# Patient Record
Sex: FEMALE | Race: WHITE | NOT HISPANIC OR LATINO | URBAN - METROPOLITAN AREA
[De-identification: names, ages, dates, MRNs, and addresses within clinical notes are randomized per-mention and may not be internally consistent; named-entity substitution may affect disease eponyms.]

---

## 2017-01-02 ENCOUNTER — TRANSFERRED RECORDS (OUTPATIENT)
Dept: HEALTH INFORMATION MANAGEMENT | Facility: CLINIC | Age: 82
End: 2017-01-02

## 2017-01-03 ENCOUNTER — HOSPITAL ENCOUNTER (INPATIENT)
Facility: CLINIC | Age: 82
LOS: 8 days | Discharge: HOME | DRG: 885 | End: 2017-01-11
Attending: PSYCHIATRY & NEUROLOGY | Admitting: PSYCHIATRY & NEUROLOGY
Payer: MEDICARE

## 2017-01-03 DIAGNOSIS — F41.8 DEPRESSION WITH ANXIETY: ICD-10-CM

## 2017-01-03 DIAGNOSIS — F32.9 MAJOR DEPRESSION: Primary | ICD-10-CM

## 2017-01-03 PROBLEM — R45.851 DEPRESSION WITH SUICIDAL IDEATION: Status: ACTIVE | Noted: 2017-01-03

## 2017-01-03 PROBLEM — F32.A DEPRESSION WITH SUICIDAL IDEATION: Status: ACTIVE | Noted: 2017-01-03

## 2017-01-03 PROCEDURE — 25000132 ZZH RX MED GY IP 250 OP 250 PS 637: Mod: GY | Performed by: PHYSICIAN ASSISTANT

## 2017-01-03 PROCEDURE — 25000132 ZZH RX MED GY IP 250 OP 250 PS 637: Mod: GY | Performed by: PSYCHIATRY & NEUROLOGY

## 2017-01-03 PROCEDURE — 90853 GROUP PSYCHOTHERAPY: CPT

## 2017-01-03 PROCEDURE — A9270 NON-COVERED ITEM OR SERVICE: HCPCS | Mod: GY | Performed by: PSYCHIATRY & NEUROLOGY

## 2017-01-03 PROCEDURE — 99232 SBSQ HOSP IP/OBS MODERATE 35: CPT | Performed by: PHYSICIAN ASSISTANT

## 2017-01-03 PROCEDURE — A9270 NON-COVERED ITEM OR SERVICE: HCPCS | Mod: GY | Performed by: PHYSICIAN ASSISTANT

## 2017-01-03 PROCEDURE — 87086 URINE CULTURE/COLONY COUNT: CPT | Performed by: PHYSICIAN ASSISTANT

## 2017-01-03 PROCEDURE — 12400002 ZZH R&B MH SENIOR/ADOLESCENT

## 2017-01-03 PROCEDURE — 99222 1ST HOSP IP/OBS MODERATE 55: CPT | Mod: AI | Performed by: PSYCHIATRY & NEUROLOGY

## 2017-01-03 RX ORDER — LOSARTAN POTASSIUM 25 MG/1
25 TABLET ORAL DAILY
Status: DISCONTINUED | OUTPATIENT
Start: 2017-01-03 | End: 2017-01-11 | Stop reason: HOSPADM

## 2017-01-03 RX ORDER — RISPERIDONE 1 MG/1
1 TABLET ORAL DAILY PRN
Status: DISCONTINUED | OUTPATIENT
Start: 2017-01-03 | End: 2017-01-03

## 2017-01-03 RX ORDER — LORAZEPAM 0.5 MG/1
0.5 TABLET ORAL EVERY 6 HOURS PRN
Status: DISCONTINUED | OUTPATIENT
Start: 2017-01-03 | End: 2017-01-11 | Stop reason: HOSPADM

## 2017-01-03 RX ORDER — CLOPIDOGREL BISULFATE 75 MG/1
75 TABLET ORAL DAILY
Status: DISCONTINUED | OUTPATIENT
Start: 2017-01-03 | End: 2017-01-11 | Stop reason: HOSPADM

## 2017-01-03 RX ORDER — ARIPIPRAZOLE 2 MG/1
4 TABLET ORAL DAILY
Status: DISCONTINUED | OUTPATIENT
Start: 2017-01-03 | End: 2017-01-03

## 2017-01-03 RX ORDER — BISACODYL 10 MG
10 SUPPOSITORY, RECTAL RECTAL DAILY PRN
Status: DISCONTINUED | OUTPATIENT
Start: 2017-01-03 | End: 2017-01-11 | Stop reason: HOSPADM

## 2017-01-03 RX ORDER — CARVEDILOL 6.25 MG/1
12.5 TABLET ORAL 2 TIMES DAILY WITH MEALS
Status: DISCONTINUED | OUTPATIENT
Start: 2017-01-03 | End: 2017-01-04

## 2017-01-03 RX ORDER — DULOXETIN HYDROCHLORIDE 60 MG/1
60 CAPSULE, DELAYED RELEASE ORAL DAILY
Status: DISCONTINUED | OUTPATIENT
Start: 2017-01-03 | End: 2017-01-03

## 2017-01-03 RX ORDER — ACETAMINOPHEN 325 MG/1
650 TABLET ORAL EVERY 4 HOURS PRN
Status: DISCONTINUED | OUTPATIENT
Start: 2017-01-03 | End: 2017-01-11 | Stop reason: HOSPADM

## 2017-01-03 RX ORDER — HYDROCHLOROTHIAZIDE 25 MG/1
25 TABLET ORAL DAILY
Status: DISCONTINUED | OUTPATIENT
Start: 2017-01-03 | End: 2017-01-11 | Stop reason: HOSPADM

## 2017-01-03 RX ORDER — ALUMINA, MAGNESIA, AND SIMETHICONE 2400; 2400; 240 MG/30ML; MG/30ML; MG/30ML
30 SUSPENSION ORAL EVERY 4 HOURS PRN
Status: DISCONTINUED | OUTPATIENT
Start: 2017-01-03 | End: 2017-01-11 | Stop reason: HOSPADM

## 2017-01-03 RX ORDER — OLANZAPINE 5 MG/1
5 TABLET ORAL
Status: DISCONTINUED | OUTPATIENT
Start: 2017-01-03 | End: 2017-01-11 | Stop reason: HOSPADM

## 2017-01-03 RX ORDER — ARIPIPRAZOLE 5 MG/1
5 TABLET ORAL DAILY
Status: DISCONTINUED | OUTPATIENT
Start: 2017-01-04 | End: 2017-01-11 | Stop reason: HOSPADM

## 2017-01-03 RX ORDER — OLANZAPINE 10 MG/2ML
5 INJECTION, POWDER, FOR SOLUTION INTRAMUSCULAR
Status: DISCONTINUED | OUTPATIENT
Start: 2017-01-03 | End: 2017-01-11 | Stop reason: HOSPADM

## 2017-01-03 RX ORDER — TRAZODONE HYDROCHLORIDE 50 MG/1
50 TABLET, FILM COATED ORAL
Status: DISCONTINUED | OUTPATIENT
Start: 2017-01-03 | End: 2017-01-11 | Stop reason: HOSPADM

## 2017-01-03 RX ORDER — ASPIRIN 81 MG/1
81 TABLET, CHEWABLE ORAL DAILY
Status: DISCONTINUED | OUTPATIENT
Start: 2017-01-03 | End: 2017-01-11 | Stop reason: HOSPADM

## 2017-01-03 RX ORDER — POLYETHYLENE GLYCOL 3350 17 G/17G
17 POWDER, FOR SOLUTION ORAL DAILY
Status: DISCONTINUED | OUTPATIENT
Start: 2017-01-03 | End: 2017-01-11 | Stop reason: HOSPADM

## 2017-01-03 RX ORDER — HYDROXYZINE HYDROCHLORIDE 25 MG/1
25-50 TABLET, FILM COATED ORAL EVERY 4 HOURS PRN
Status: DISCONTINUED | OUTPATIENT
Start: 2017-01-03 | End: 2017-01-11 | Stop reason: HOSPADM

## 2017-01-03 RX ORDER — NITROGLYCERIN 0.4 MG/1
0.4 TABLET SUBLINGUAL EVERY 5 MIN PRN
Status: DISCONTINUED | OUTPATIENT
Start: 2017-01-03 | End: 2017-01-11 | Stop reason: HOSPADM

## 2017-01-03 RX ORDER — ATORVASTATIN CALCIUM 40 MG/1
40 TABLET, FILM COATED ORAL AT BEDTIME
Status: DISCONTINUED | OUTPATIENT
Start: 2017-01-03 | End: 2017-01-11 | Stop reason: HOSPADM

## 2017-01-03 RX ORDER — ASCORBIC ACID 500 MG
500 TABLET ORAL DAILY
Status: DISCONTINUED | OUTPATIENT
Start: 2017-01-03 | End: 2017-01-11 | Stop reason: HOSPADM

## 2017-01-03 RX ORDER — NYSTATIN 100000 U/G
CREAM TOPICAL 3 TIMES DAILY PRN
Status: DISCONTINUED | OUTPATIENT
Start: 2017-01-03 | End: 2017-01-11 | Stop reason: HOSPADM

## 2017-01-03 RX ORDER — NYSTATIN 100000 U/G
CREAM TOPICAL 3 TIMES DAILY PRN
COMMUNITY

## 2017-01-03 RX ORDER — MIRTAZAPINE 15 MG/1
15 TABLET, FILM COATED ORAL AT BEDTIME
Status: DISCONTINUED | OUTPATIENT
Start: 2017-01-03 | End: 2017-01-11 | Stop reason: HOSPADM

## 2017-01-03 RX ORDER — LORAZEPAM 1 MG/1
1 TABLET ORAL EVERY 6 HOURS PRN
Status: DISCONTINUED | OUTPATIENT
Start: 2017-01-03 | End: 2017-01-03

## 2017-01-03 RX ORDER — PANTOPRAZOLE SODIUM 40 MG/1
40 TABLET, DELAYED RELEASE ORAL
Status: DISCONTINUED | OUTPATIENT
Start: 2017-01-03 | End: 2017-01-11 | Stop reason: HOSPADM

## 2017-01-03 RX ORDER — POLYETHYLENE GLYCOL 3350 17 G/17G
17 POWDER, FOR SOLUTION ORAL DAILY
COMMUNITY

## 2017-01-03 RX ORDER — ARIPIPRAZOLE 2 MG/1
4 TABLET ORAL DAILY
Status: ON HOLD | COMMUNITY
End: 2017-01-10

## 2017-01-03 RX ADMIN — CHOLECALCIFEROL CAP 125 MCG (5000 UNIT) 5000 UNITS: 125 CAP at 09:22

## 2017-01-03 RX ADMIN — POLYETHYLENE GLYCOL 3350 17 G: 17 POWDER, FOR SOLUTION ORAL at 09:18

## 2017-01-03 RX ADMIN — PANTOPRAZOLE SODIUM 40 MG: 40 TABLET, DELAYED RELEASE ORAL at 06:58

## 2017-01-03 RX ADMIN — HYDROCHLOROTHIAZIDE 25 MG: 25 TABLET ORAL at 09:27

## 2017-01-03 RX ADMIN — ATORVASTATIN CALCIUM 40 MG: 40 TABLET, FILM COATED ORAL at 21:18

## 2017-01-03 RX ADMIN — CLOPIDOGREL BISULFATE 75 MG: 75 TABLET, FILM COATED ORAL at 09:22

## 2017-01-03 RX ADMIN — LOSARTAN POTASSIUM 25 MG: 25 TABLET, FILM COATED ORAL at 09:26

## 2017-01-03 RX ADMIN — CARVEDILOL 12.5 MG: 6.25 TABLET, FILM COATED ORAL at 09:27

## 2017-01-03 RX ADMIN — MIRTAZAPINE 15 MG: 15 TABLET, FILM COATED ORAL at 21:18

## 2017-01-03 RX ADMIN — CARVEDILOL 12.5 MG: 6.25 TABLET, FILM COATED ORAL at 18:37

## 2017-01-03 RX ADMIN — ARIPIPRAZOLE 4 MG: 2 TABLET ORAL at 12:19

## 2017-01-03 RX ADMIN — ASPIRIN 81 MG CHEWABLE TABLET 81 MG: 81 TABLET CHEWABLE at 09:25

## 2017-01-03 ASSESSMENT — ACTIVITIES OF DAILY LIVING (ADL)
GROOMING: INDEPENDENT
GROOMING: INDEPENDENT
ORAL_HYGIENE: INDEPENDENT
ORAL_HYGIENE: INDEPENDENT
DRESS: INDEPENDENT;STREET CLOTHES
ORAL_HYGIENE: INDEPENDENT
GROOMING: INDEPENDENT
DRESS: INDEPENDENT
DRESS: INDEPENDENT

## 2017-01-03 NOTE — IP AVS SNAPSHOT
MRN:[id number]                      After Visit Summary   1/3/2017    Luna Hassan    MRN: [id number]           Thank you!     Thank you for choosing [facility] for your care. Our goal is always to provide you with excellent care.        Patient Information     Date Of Birth          12/12/1932        About your hospital stay     You were admitted on:  January 3, 2017 You last received care in the:  [address]    You were discharged on:  January 11, 2017       Who to Call     For medical emergencies, please call 911.  For non-urgent questions about your medical care, please call your primary care provider or clinic, [phone]          Attending Provider     Provider    Zoey Alvarez MD       Primary Care Provider Office Phone # Fax #    Valentina Jacques [phone]       [facility] [address] [place] [address]        Your next 10 appointments already scheduled     Jan 19, 2017  9:00 AM   Evaluation with DAISHA Minor   [facility] ([facility])    [address] [address] [phone]              Future tests that were ordered for you     Behavior Outpatient Eval                 Further instructions from your care team       Behavioral Discharge Planning and Instructions   Summary:   [facility], [facility] for depression. While on the unit, your symptoms stabilized. You deny any current suicidal or homicidal ideation. You are discharged today to [address] [place] [address].  Phone number:  [phone].    Main Diagnosis:   1.  Major depressive disorder, recurrent, severe, without psychotic features.    2.  Anxiety, not otherwise specified (major depressive disorder with prominent anxiety symptoms).    3.  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      Major Treatments, Procedures and Findings:    Dr. Alvarez, your treating psychiatrist, adjusted your medications and managed your care throughout your stay. An internal medicine consult was completed during your stay. You had the opportunity to participate in treatment programming while on the unit including occupational therapy, mental health support and education and spiritual services.     Symptoms to Report:   Increased confusion, losing more sleep, mood getting worse, and/or thoughts of suicide.     Lifestyle Adjustment:   Stay in touch with your psychiatrist for any questions, and contact the doctor as soon as you experience a recurrence of depressive symptoms. Don't wait for the symptoms to progress to severe before getting help.    Follow-up Appointments:   Psychiatrist:     Yrn Page Tuesday, February 14th at 1:30 pm.  [facility]  [address] [place], [place] [address], [place] [address]  [phone]  Therapist:  Flakito Plata - Tuesday, January 17th at 2:00 pm.  [facility]  [address] [facility], [place] [address]  [phone]    Day Treatment:    [address] Outpatient Program, located in the [facility] the [facility].  Address: [address] [place] [address].    Phone number: [phone] direct number for the program or [phone] for the Adult Outpatient Assessment/Intake phone number  Your Intake date is:  Thursday, January 19th at 9:00 am.    Resources:   [facility] Crisis Team (24 hour/7days a week): [phone].                   Crisis Intervention: 417.803.4421 or 601-262-7597 (TTY: 135.715.4998). Call anytime for help.   [employer] [place] [employer] Mental Illness (www.[place].jacob.org): 953.141.7441 or 502-085-4833.   Mental Health Consumer/Survivor Network of [place] (www.mhcsn.net): 394.320.9994 or 814-443-7527  Mental Health  "Association of [place] (www.mentalhealth.org): 318-933-6846 or 711-825-4140    General Medication Instructions:   See your medication sheet(s) for instructions.   Take all medicines as directed. Make no changes unless your doctor suggests them.   Go to all your doctor visits.   Be sure to have all your required lab tests. This way, your medicines can be refilled on time.   Do not use any drugs not prescribed by your doctor.   Avoid alcohol.    The treatment team has appreciated the opportunity to work with you.  We wish you the best in the future. If you have any questions or concerns our unit number is [phone].        Pending Results     No orders found from 2017 to 2017.            Admission Information        Provider Department Dept Phone    1/3/2017 Zoey Alvarez MD [address] [phone]      Your Vitals Were     Blood Pressure Pulse Temperature    187/55 mmHg 72 97.2  F (36.2  C) (Oral)    Respirations Height Weight    16 1.6 m (5' 3\") 58.65 kg (129 lb 4.8 oz)    BMI (Body Mass Index)          22.91 kg/m2        MyChart Information     [employer] lets you send messages to your doctor, view your test results, renew your prescriptions, schedule appointments and more. To sign up, go to [email or web address] . Click on \"Log in\" on the left side of the screen, which will take you to the Welcome page. Then click on \"Sign up Now\" on the right side of the page.     You will be asked to enter the access code listed below, as well as some personal information. Please follow the directions to create your username and password.     Your access code is: [email or web address]  Expires: 4/10/2017  2:49 PM     Your access code will  in 90 days. If you need help or a new code, please call your [facility] or [phone].        Care EveryWhere ID     This is your Care EveryWhere ID. This could be used by other organizations to access your [facility] medical records  [id number]           Review of your medicines    "   START taking        Dose / Directions    traZODone 50 MG tablet   Commonly known as:  DESYREL   Used for:  Depression with anxiety        Dose:  50 mg   Take 1 tablet (50 mg) by mouth nightly as needed for sleep   Quantity:  30 tablet   Refills:  0         CONTINUE these medicines which may have CHANGED, or have new prescriptions. If we are uncertain of the size of tablets/capsules you have at home, strength may be listed as something that might have changed.        Dose / Directions    ARIPiprazole 5 MG tablet   Commonly known as:  ABILIFY   This may have changed:    - medication strength  - how much to take   Used for:  Depression with anxiety        Dose:  5 mg   Take 1 tablet (5 mg) by mouth daily   Quantity:  30 tablet   Refills:  0       carvedilol 25 MG tablet   Commonly known as:  COREG   This may have changed:    - medication strength  - how much to take        Dose:  25 mg   Take 1 tablet (25 mg) by mouth 2 times daily (with meals)   Quantity:  60 tablet   Refills:  0       DULoxetine 30 MG EC capsule   Commonly known as:  CYMBALTA   This may have changed:    - medication strength  - how much to take   Used for:  Depression with anxiety        Dose:  90 mg   Take 3 capsules (90 mg) by mouth daily   Quantity:  90 capsule   Refills:  0       LORazepam 0.5 MG tablet   Commonly known as:  ATIVAN   This may have changed:    - medication strength  - how much to take   Used for:  Depression with anxiety        Dose:  0.5 mg   Take 1 tablet (0.5 mg) by mouth every 6 hours as needed for anxiety   Quantity:  30 tablet   Refills:  0         CONTINUE these medicines which have NOT CHANGED        Dose / Directions    ascorbic acid 500 MG Cpcr CR capsule   Commonly known as:  vitamin C        Dose:  500 mg   Take 500 mg by mouth daily   Refills:  0       ASPIRIN PO        Dose:  81 mg   Take 81 mg by mouth daily   Refills:  0       COZAAR PO        Dose:  25 mg   Take 25 mg by mouth daily   Refills:  0        diclofenac 1 % Gel topical gel   Commonly known as:  VOLTAREN        Place onto the skin 3 times daily as needed for moderate pain   Refills:  0       HYDROCHLOROTHIAZIDE PO        Dose:  25 mg   Take 25 mg by mouth daily   Refills:  0       hydrOXYzine 25 MG tablet   Commonly known as:  ATARAX   Used for:  Depression with anxiety, Itching        Dose:  25-50 mg   Take 1-2 tablets (25-50 mg) by mouth every 4 hours as needed for itching or anxiety   Quantity:  60 tablet   Refills:  0       LIPITOR PO        Dose:  40 mg   Take 40 mg by mouth At Bedtime   Refills:  0       mirtazapine 15 MG tablet   Commonly known as:  REMERON   Used for:  Depression with anxiety        Dose:  15 mg   Take 1 tablet (15 mg) by mouth At Bedtime   Quantity:  30 tablet   Refills:  0       NITROSTAT SL        Dose:  0.4 mg   Place 0.4 mg under the tongue every 5 minutes as needed for chest pain   Refills:  0       nystatin cream   Commonly known as:  MYCOSTATIN        Apply topically 3 times daily as needed for dry skin (Rashes under breast)   Refills:  0       PLAVIX PO        Dose:  75 mg   Take 75 mg by mouth daily   Refills:  0       polyethylene glycol powder   Commonly known as:  MIRALAX/GLYCOLAX   Indication:  Constipation        Dose:  17 g   Take 17 g by mouth daily   Refills:  0       PREMARIN PO        Dose:  0.5-1 g   Place 0.5-1 g vaginally three times a week M,W,F   Refills:  0       PROTONIX PO        Dose:  40 mg   Take 40 mg by mouth every morning (before breakfast)   Refills:  0       VITAMIN D (CHOLECALCIFEROL) PO        Dose:  5000 Units   Take 5,000 Units by mouth daily   Refills:  0         STOP taking     CIPROFLOXACIN PO           RISPERDAL PO                Where to get your medicines      These medications were sent to [facility] [place], [place] - [address]  [address] [address]     Phone:  [phone]    - ARIPiprazole 5 MG tablet  - DULoxetine 30 MG EC capsule  -  mirtazapine 15 MG tablet  - traZODone 50 MG tablet      Some of these will need a paper prescription and others can be bought over the counter. Ask your nurse if you have questions.     Bring a paper prescription for each of these medications    - carvedilol 25 MG tablet  - LORazepam 0.5 MG tablet             Protect others around you: Learn how to safely use, store and throw away your medicines at www.disposemymeds.org.             Medication List: This is a list of all your medications and when to take them. Check marks below indicate your daily home schedule. Keep this list as a reference.      Medications           Morning Afternoon Evening Bedtime As Needed    ARIPiprazole 5 MG tablet   Commonly known as:  ABILIFY   Take 1 tablet (5 mg) by mouth daily   Last time this was given:  5 mg on 1/11/2017  8:51 AM                                   ascorbic acid 500 MG Cpcr CR capsule   Commonly known as:  vitamin C   Take 500 mg by mouth daily                                   ASPIRIN PO   Take 81 mg by mouth daily   Last time this was given:  81 mg on 1/11/2017  8:51 AM                                   carvedilol 25 MG tablet   Commonly known as:  COREG   Take 1 tablet (25 mg) by mouth 2 times daily (with meals)   Last time this was given:  25 mg on 1/11/2017  5:58 PM            With Breakfast           With Supper                 COZAAR PO   Take 25 mg by mouth daily   Last time this was given:  25 mg on 1/11/2017  8:51 AM                                   diclofenac 1 % Gel topical gel   Commonly known as:  VOLTAREN   Place onto the skin 3 times daily as needed for moderate pain                            FOR PAIN         DULoxetine 30 MG EC capsule   Commonly known as:  CYMBALTA   Take 3 capsules (90 mg) by mouth daily   Last time this was given:  90 mg on 1/10/2017  9:37 PM                                   HYDROCHLOROTHIAZIDE PO   Take 25 mg by mouth daily   Last time this was given:  25 mg on 1/11/2017  8:51 AM                                    hydrOXYzine 25 MG tablet   Commonly known as:  ATARAX   Take 1-2 tablets (25-50 mg) by mouth every 4 hours as needed for itching or anxiety   Last time this was given:  25 mg on 1/10/2017  2:26 AM                            FOR ITCHING/ANXIETY       LIPITOR PO   Take 40 mg by mouth At Bedtime   Last time this was given:  40 mg on 1/10/2017  9:37 PM                                   LORazepam 0.5 MG tablet   Commonly known as:  ATIVAN   Take 1 tablet (0.5 mg) by mouth every 6 hours as needed for anxiety   Last time this was given:  0.5 mg on 1/8/2017 11:08 AM                            FOR ANXIETY         mirtazapine 15 MG tablet   Commonly known as:  REMERON   Take 1 tablet (15 mg) by mouth At Bedtime   Last time this was given:  15 mg on 1/10/2017  9:37 PM                                   NITROSTAT SL   Place 0.4 mg under the tongue every 5 minutes as needed for chest pain                            FOR CHEST PAIN         nystatin cream   Commonly known as:  MYCOSTATIN   Apply topically 3 times daily as needed for dry skin (Rashes under breast)                            FOR RASH         PLAVIX PO   Take 75 mg by mouth daily   Last time this was given:  75 mg on 1/11/2017  8:51 AM                                   polyethylene glycol powder   Commonly known as:  MIRALAX/GLYCOLAX   Take 17 g by mouth daily                                   PREMARIN PO   Place 0.5-1 g vaginally three times a week M,W,F                        MONDAYS, WEDNESDAY AND FRIDAYS             PROTONIX PO   Take 40 mg by mouth every morning (before breakfast)   Last time this was given:  40 mg on 1/11/2017  6:43 AM            BEFORE BREAKFAST                         traZODone 50 MG tablet   Commonly known as:  DESYREL   Take 1 tablet (50 mg) by mouth nightly as needed for sleep   Last time this was given:  50 mg on 1/10/2017  2:26 AM                            AT BEDTIME FOR SLEEP       VITAMIN D  (CHOLECALCIFEROL) PO   Take 5,000 Units by mouth daily

## 2017-01-03 NOTE — PLAN OF CARE
"Problem: Depressive Symptoms  Goal: Depressive Symptoms  Signs and symptoms of listed problems will be absent or manageable.   Patient will report a decrease in depressive symptoms.  Patient will sleep 7-8 hours at night without much interruptions.  Patient will be out in the milieu and attend the programming offered on the unit.  Patient will be medication compliant.  Patient will eat 75% of meals provided in the unit.   Outcome: No Change  Admitted an 84-year old female, , from [facility] due to depression with suicidal plan to .     Patient admits being depressed since she and her  have moved into an assisted living environment in [place]. She said she has been having difficulty adjusting to their new residence and has never been happy about it. Her depression increased during the holidays.  She reported decreased appetite and low energy. She has been seeing a psychiatrist but has not seen her lately due to the holidays. According to her, her primary stressor is \"being tired of being old.\" She has been admitted here on 3B last year due to the same diagnosis. She was also hospitalized at [facility]. She denies having hallucinations. She denies use of alcohol and drugs. She never smoked in her whole lifetime. She has allergies to Amlodipine, Macrodantin, Lisinopril and walnuts. She has not fallen the last six months. Her last fall was in May, 2016. Urinalysis was done at [facility] and results showed UTI. Patient has been started on Cipro treatment.     Patient is alert and oriented to person, place, date and time. Appeared sad, lonely and depressed. Looks neat and well-groomed. Affect was sad, insight was appropriate to the situation, and judgment  was impaired. Memory is intact. Admits that she feels depressed and unhappy because of her change in residence. Admits that she feels suicidal right now but contracted for safety. Patient was offered some snacks and made " comfortable in bed. Extra blanket was provided.Slept as soon as she laid down in bed. Slept a total of 5 hours.     Admission profile has been completed. Please continue to assess on her depression and grief issues.

## 2017-01-03 NOTE — PROGRESS NOTES
[facility] pharmacy ([phone]) in [facility] was contacted, at patients request, to verify Abilify dosage.   Pt is to take 4 mg Abilify every morning and this was last filled on December 13.

## 2017-01-03 NOTE — CONSULTS
HISTORY OF PRESENT ILLNESS:  Luna Hassan is an 84-year-old female with history of depression, admitted to station 3B after presenting to outside hospital with suicidal ideation within the context of her and her  being asked to move into an assisted-living environment and are having trouble adjusting.  An Internal Medicine consultation was ordered by Dr. Alvarez to assess multiple medical problems including possible UTI, as well as hypertension and chronic congestive heart failure.  At this time, Luna specifically denies acute physical concerns including fever, chills, chest pain, shortness of breath, abdominal pain, nausea, bowel or bladder concerns.  Specifically, denies dysuria and increased urinary frequency.      PAST MEDICAL HISTORY:   1.  Psychiatric history per Dr. Alvarez.   2.  Coronary artery disease status post stenting x3, 2005, followed by another coronary intervention with stenting x1 in 2009.  The patient states, since, her cardiac status has been relatively unremarkable.   3.  Congestive systolic heart failure, based on echocardiogram from 12/2015, revealing an estimated ejection fraction of 45%.  Remains on ACE inhibitor and beta-blocker therapy.   4.  Hyperlipidemia.   5.  Hypertension.   6.  GERD.   7.  Chronic bilateral knee pain.   8.  Status post cataract extraction.   9.  Status post left wrist surgery, 06/2016.   10.  Denies history of other chronic medical problems and surgeries including asthma, obstructive sleep apnea and diabetes.      ADMISSION MEDICATIONS:   1.  Ascorbic acid 500 mg p.o. daily.   2.  Mycostatin cream applied topically 3 times daily as needed, dry skin.   3.  Lorazepam 1 mg p.o. q.6 h. p.r.n. anxiety.   4.  MiraLax 17 gram packet p.o. daily.   5.  Risperdal 1 mg p.o. daily p.r.n. anxiety.   6.  Hydroxyzine 25-50 mg p.o. q.4 h. p.r.n. anxiety.   7.  Cymbalta 60 mg p.o. daily.   8.  Remeron 15 mg p.o. each day at bedtime.   9.  Aspirin 81 mg p.o. daily.    10.  Atorvastatin 40 mg p.o. nightly.    11.  Coreg 12.5 mg p.o. b.i.d.   12.  Vitamin D 5000 units p.o. daily.    13.  Plavix 75 mg p.o. daily.   14.  Premarin cream 0.5 to 1 gram vaginal once per day on Monday, Wednesday and Friday.   15.  Topical Voltaren 1% gel 2 grams transdermal t.i.d. p.r.n. pain.   16.  Hydrochlorothiazide 25 mg p.o. daily.   17.  Losartan 25 mg p.o. daily.   18.  Nitroglycerin 0.4 mg sublingual every 5 minutes p.r.n. chest pain.   19.  Protonix 40 mg p.o. q.a.m.      ALLERGIES AND ADVERSE EFFECTS:  Amlodipine, lisinopril, Macrodantin, penicillin, and walnuts.       SOCIAL HISTORY:   with 2 grown children, 4 grandchildren and 2 great-grandchildren.  States she was recently living in [place].  Denies problems with alcohol and drugs.  Denies smoking cigarettes.      FAMILY HISTORY:  Reviewed and noncontributory.      REVIEW OF SYSTEMS:  Ten-point review of systems negative, except as stated above in history of present illness.      PHYSICAL EXAMINATION:   GENERAL:  Pleasant lady in no acute distress.   VITAL SIGNS:  Stable.  Temperature afebrile, pulse in the 70s, blood pressure 192/60.   HEENT:  Negative.   NECK:  Supple, no cervical adenopathy or thyromegaly.   LUNGS:  Clear.   CARDIOVASCULAR:  Regular rate and rhythm with 1/6 to 2/6 systolic murmur noted.   ABDOMEN:  Soft, nontender, nondistended.   EXTREMITIES:  No edema.   SKIN:  No rash noted on exposed areas.   NEUROLOGIC:  She is awake, alert and oriented x3.  Cranial nerves are grossly intact.  No significant tremor noted.  Motor strength is symmetric.  Gait deferred.      LABORATORY DATA:  From outside hospital prior to transfer:  CBC and basic metabolic panel unremarkable.  Urinalysis, however, 26-50 white blood cells, large amount of leukocyte esterase, otherwise unremarkable.      ASSESSMENT:     1.  Depression and behavioral concerns, per Dr. Alvarez.   2.  Marked microscopic pyuria on outside urinalysis of which  patient was given a 1-time dose of ciprofloxacin for possible urinary tract infection; however, patient is asymptomatic.   3.  History of coronary artery disease and congestive heart failure, appears to be well compensated at this time.   4.  Hypertension, stable with current blood pressures this morning significantly elevated; however, most likely due to anxiety within the context of not yet receiving her usual daily antihypertensive medications.  The patient remains asymptomatic.   5.  Gastroesophageal reflux disease, stable, on daily PPI therapy.   6.  Chronic knee pain, stable with use of as-needed topical diclofenac.      PLAN:  Will obtain a routine urine culture.  In the interim, hold antibiotics.  Overall, cardiopulmonary status will be monitored closely.  Continue to monitor blood pressures closely as well and continue with current antihypertensive medications as ordered with parameters to hold.  No further medical intervention indicated at this time.  The patient is medically stable, and I will be happy to follow up and see her again during her stay for any intercurrent medical issues.         STEFAN MATTHEWS PA-C             D: 2017 11:56   T: 2017 12:29   MT: AMINTA      Name:     CHRIS ROGERS   MRN:      [id number]        Account:       [id number]   :      1932           Consult Date:  2017      Document: [id number]       cc: Zoey Alvarez MD

## 2017-01-03 NOTE — PROGRESS NOTES
"INITIAL PSYCHOSOCIAL ASSESSMENT   I have reviewed the chart, met with the patient, and initiated the Plan of Care. Information for assessment was obtained from: patient and chart    Presenting Problem:  History of Mental Health and Chemical Dependency:    Pt is a 84 yr old female who was at [facility] yesterday for plan to overdose and was transferred to this facility due to lack of beds.  Pt reports decreased appetite and low energy.  Pt reports she and her  of 60 yrs recently moved into an assisted living environment and are having trouble adjusting.  Pt has a psychiatrist.  Pt reports increasing dep over the holidays.  Primary stressors: \"being tired of being old.\"  Pt was tearful in ED.  Utox pos for benzos but pt taking Ativan.  Pt has a therapist she has not been able to see for a few weeks due to the holidays.  Pt reports she manages her own meds @ the assisted living facility.  Hx of ECT.  Family Description (Constellation, Family Psychiatric History):  Spouse, Pepe, sons Everett and Reji.(DAMARI signed) Four grandchildren and two great-grandchildren.  Significant Life Events (Illness, Abuse, Trauma, Death):      I broke my wrist in September.     Living Situation:     Patient lives with spouse at [facility] in [place].  They are having trouble adjusting to this recent move.    Educational Background:     Degree in English.    Occupational History:     Retired. Worked mostly as a .    Financial Status:      Stable. Receives social security and ROXY.    Legal Issues: None.    Ethnic/Cultural Issues: No.    Spiritual Orientation: Oriental orthodox.     Service History: No.    Social Functioning (organization, interests):     Patient enjoys gardening and reading.    Current Treatment Providers are:    Psychiatrist:     [facility] [address]   [address], [place] [address]  [phone]  Therapist:  [facility] [address]   [address], [place] [address]  [phone]  Social Service Assessment/Plan:.         Hospital staff will provide a safe environment and a therapeutic milieu. Pt will have psychiatric assessment and medication management by the psychiatrist. CTC will do individual inpatient treatment planning and after care planning. Staff will continue to assess pt as needed. Patient will participate in unit groups and activities. Pt will receive individual and group support on the unit. Pt s discharge goal is are ) I want to feel better physically and emotionally.  Patient admitted for safety/stabilization of mood disorder sx's.  Medication will be reviewed, adjusted per MD's as indicated.   Will contact outpatient providers for care coordination.  Will discuss options for increased community supports.  Will continue to assess, coordinate care, and ensure appropriate f/u care is in place

## 2017-01-03 NOTE — PROGRESS NOTES
Nothing sent to security.    In pt's locker....overcoat, cane, shoes w/laces baggie w/medicare and insurance card and clip-on dark glasses and clothes, pads, 2 cloth bags with handles, crucifix     With pt...glasses and clothes. Clothes, robe, slippers  ADMISSION:  I am responsible for any personal items that are not sent to the safe or pharmacy. [facility] is not responsible for loss, theft or damage of any property in my possession.    Patient Signature _____________________ Date/Time _____________________    Staff Signature _______________________ Date/Time _____________________    2nd Staff person, if patient is unable/unwilling to sign  ___________________________________ Date/Time _____________________    DISCHARGE:  My personal items have been returned to me.   Patient Signature _____________________ Date/Time _____________________

## 2017-01-03 NOTE — PROGRESS NOTES
Attended 1 of 2 OT groups. She was pleasant, quiet though spoke up with answers in context on topic of identifying helpful strategies for self calming using Sensory Modalities. She elaborated on comments and offered ideas that were creative. Affect was often flat though brightened some with these interactions of her answers. She seemed attentive, interested in being actively involved and quick to speak up at her turns. Concentration seemed on topic. Will assess further with additional attendance. Pt will be given to complete a written self assessment. OT purpose was explained with the value of having involvement in treatment plan, and provided options to meet self identified goals. Plan: Provide structure, support, and encouragement through attendance to groups. Assist pt to increase self awareness regarding expanding helpful coping strategies.  Assist in pt setting goal focus. Assess further.

## 2017-01-03 NOTE — PROGRESS NOTES
"   01/03/17 1500   Behavioral Health Interventions   Depression maintain safety precautions;maintain safe secure environment;provide emotional support;establish therapeutic relationship;assist with developing and utilizing healthy coping strategies;build upon strengths   Social and Therapeutic Interventions (Depression) encourage socialization with peers;encourage participation in therapeutic groups and milieu activities     Pt stated she's feeling \"terrible\", and stated she's feeling depressed (8/10) and anxious (6-7/10). Says she feels lonely, and misses her . Stated she had thoughts but no plans of SI yesterday, but didn't have any today. No SIB or observed hallucinations.  "

## 2017-01-03 NOTE — IP AVS SNAPSHOT
[facility]    [address]    [place] [address]    Phone:  [phone]                                       After Visit Summary   1/3/2017    Luna Hassan    MRN: [id number]           After Visit Summary Signature Page     I have received my discharge instructions, and my questions have been answered. I have discussed any challenges I see with this plan with the nurse or doctor.    ..........................................................................................................................................  Patient/Patient Representative Signature      ..........................................................................................................................................  Patient Representative Print Name and Relationship to Patient    ..................................................               ................................................  Date                                            Time    ..........................................................................................................................................  Reviewed by Signature/Title    ...................................................              ..............................................  Date                                                            Time

## 2017-01-03 NOTE — PLAN OF CARE
Problem: General Plan of Care (Inpatient Behavioral)  Goal: Team Discussion  Team Plan:   BEHAVIORAL TEAM DISCUSSION    Continued Stay Criteria/Rationale: Suicidal ideation and depression  Plan: Psychiatric assessment, therapeutic milieu, medication management, individual and group support  Participants: Ivett Keita RN, Concepción Richard [facility]  Summary/Recommendation: The plan is to assess the patient for mental health and medication needs.  The patient will be prescribed medications to treat the identified symptoms.  Upon discharge the patient will be referred to services as appropriate based on the assessment.  Medical/Physical: See medical notes  Progress: Initial

## 2017-01-03 NOTE — PLAN OF CARE
Problem: General Plan of Care (Inpatient Behavioral)  Goal: Individualization/Patient Specific Goal (IP Behavioral)  The patient and/or their representative will achieve their patient-specific goals related to the plan of care.    The patient-specific goals include:   Reasons you are in the hospital:    1)  Thinking of overdosing  2)  Unhappy with living situation    Goals for Discharge:    1)  I want to be physically and emotionally better

## 2017-01-03 NOTE — PROGRESS NOTES
This writer spoke with pt's son, Omar at [phone], to introduce self and provide contact information. Omar has no questions at this time as pt has not yet met with her doctor.

## 2017-01-04 LAB
BACTERIA SPEC CULT: NORMAL
Lab: NORMAL
MICRO REPORT STATUS: NORMAL
SPECIMEN SOURCE: NORMAL

## 2017-01-04 PROCEDURE — 25000132 ZZH RX MED GY IP 250 OP 250 PS 637: Mod: GY | Performed by: PSYCHIATRY & NEUROLOGY

## 2017-01-04 PROCEDURE — A9270 NON-COVERED ITEM OR SERVICE: HCPCS | Mod: GY | Performed by: INTERNAL MEDICINE

## 2017-01-04 PROCEDURE — A9270 NON-COVERED ITEM OR SERVICE: HCPCS | Mod: GY | Performed by: PSYCHIATRY & NEUROLOGY

## 2017-01-04 PROCEDURE — 99232 SBSQ HOSP IP/OBS MODERATE 35: CPT | Performed by: PSYCHIATRY & NEUROLOGY

## 2017-01-04 PROCEDURE — A9270 NON-COVERED ITEM OR SERVICE: HCPCS | Mod: GY | Performed by: PHYSICIAN ASSISTANT

## 2017-01-04 PROCEDURE — 90853 GROUP PSYCHOTHERAPY: CPT

## 2017-01-04 PROCEDURE — 25000132 ZZH RX MED GY IP 250 OP 250 PS 637: Mod: GY | Performed by: PHYSICIAN ASSISTANT

## 2017-01-04 PROCEDURE — 25000132 ZZH RX MED GY IP 250 OP 250 PS 637: Mod: GY | Performed by: INTERNAL MEDICINE

## 2017-01-04 PROCEDURE — 12400002 ZZH R&B MH SENIOR/ADOLESCENT

## 2017-01-04 PROCEDURE — 97150 GROUP THERAPEUTIC PROCEDURES: CPT | Mod: GO

## 2017-01-04 RX ORDER — CARVEDILOL 25 MG/1
25 TABLET ORAL 2 TIMES DAILY WITH MEALS
Status: DISCONTINUED | OUTPATIENT
Start: 2017-01-04 | End: 2017-01-11 | Stop reason: HOSPADM

## 2017-01-04 RX ORDER — CARVEDILOL 6.25 MG/1
12.5 TABLET ORAL ONCE
Status: COMPLETED | OUTPATIENT
Start: 2017-01-04 | End: 2017-01-04

## 2017-01-04 RX ADMIN — CARVEDILOL 12.5 MG: 6.25 TABLET, FILM COATED ORAL at 10:31

## 2017-01-04 RX ADMIN — PANTOPRAZOLE SODIUM 40 MG: 40 TABLET, DELAYED RELEASE ORAL at 06:46

## 2017-01-04 RX ADMIN — OXYCODONE HYDROCHLORIDE AND ACETAMINOPHEN 500 MG: 500 TABLET ORAL at 08:06

## 2017-01-04 RX ADMIN — POLYETHYLENE GLYCOL 3350 17 G: 17 POWDER, FOR SOLUTION ORAL at 08:06

## 2017-01-04 RX ADMIN — ATORVASTATIN CALCIUM 40 MG: 40 TABLET, FILM COATED ORAL at 20:33

## 2017-01-04 RX ADMIN — LOSARTAN POTASSIUM 25 MG: 25 TABLET, FILM COATED ORAL at 08:07

## 2017-01-04 RX ADMIN — HYDROCHLOROTHIAZIDE 25 MG: 25 TABLET ORAL at 08:07

## 2017-01-04 RX ADMIN — CONJUGATED ESTROGENS 1 G: 0.62 CREAM VAGINAL at 20:33

## 2017-01-04 RX ADMIN — CARVEDILOL 25 MG: 25 TABLET, FILM COATED ORAL at 16:42

## 2017-01-04 RX ADMIN — DULOXETINE 90 MG: 60 CAPSULE, DELAYED RELEASE ORAL at 20:33

## 2017-01-04 RX ADMIN — CHOLECALCIFEROL CAP 125 MCG (5000 UNIT) 5000 UNITS: 125 CAP at 08:06

## 2017-01-04 RX ADMIN — MIRTAZAPINE 15 MG: 15 TABLET, FILM COATED ORAL at 20:33

## 2017-01-04 RX ADMIN — CARVEDILOL 12.5 MG: 6.25 TABLET, FILM COATED ORAL at 08:07

## 2017-01-04 RX ADMIN — ARIPIPRAZOLE 5 MG: 5 TABLET ORAL at 08:06

## 2017-01-04 RX ADMIN — ASPIRIN 81 MG CHEWABLE TABLET 81 MG: 81 TABLET CHEWABLE at 08:06

## 2017-01-04 RX ADMIN — CLOPIDOGREL BISULFATE 75 MG: 75 TABLET, FILM COATED ORAL at 08:07

## 2017-01-04 ASSESSMENT — ACTIVITIES OF DAILY LIVING (ADL)
GROOMING: INDEPENDENT
ORAL_HYGIENE: INDEPENDENT
DRESS: INDEPENDENT
GROOMING: INDEPENDENT
ORAL_HYGIENE: INDEPENDENT
DRESS: INDEPENDENT

## 2017-01-04 NOTE — PROGRESS NOTES
"   01/04/17 1400   General Information   Special Considerations completed on 1-4   Clinical Impression   Affect Flat   Orientation Oriented to person, place and time   Appearance and ADLs General cleanliness observed in most areas   Attention to Internal Stimuli No observed signs   Interaction Skills Initiates appropriately with staff;Initiates appropriately with peers   Ability to Communicate Needs Independent   Verbal Content Clear;Appropriate to topic   Ability to Maintain Boundaries Maintains appropriate physical boundaries;Maintains appropriate verbal boundaries   Participation Independently participates   Concentration Concentrates 50 minutes;Needs further assessment   Ability to Concentrate With structure   Follows and Comprehends Directions Needs further assessment   Memory Delayed and immediate recall intact;Needs further assessment   Organization Needs further assessment   Decision Making Independent   Planning and Problem Solving Needs further assessment   Ability to Apply and Learn Concepts Applies within group structure   Frustrations / Stress Tolerance Independently identifies sources of frustration/stress   Level of Insight Insightful into needs, issues, goals;Needs further assessment   Self Esteem Can identify positives   Social Supports Identifies utilizing supports   General Observation/Plan   General Observations/Plan See Comments   Attended 2 of 2 OT groups. She took time filling out the OT goal form with several breaks to seemingly listen to peers and join in an occasional comment. She stated reason for admission as \"I have depression and anxiety\". Changes she wants to accomplish here is \"to be more independent and I would like to be less frightened of life\". She stated \"this is new to me and very scary\". She participated in activity focused on the Process of Recovery. She elaborated on several comments though expressed expansive ideas on Hope. She stated listening to a speaker on Hope in the " past and felt it to be very powerful and helpful to her. Participation was at a more relaxed pace. Pt was given and completed a written self assessment. she chose the goal focus to deal with frustration more effectively, increase motivation, time management and comfort in trying new things. OT purpose was explained with the value of having involvement in treatment plan, and provided options to meet self identified goals. Plan: Provide structure, support, and encouragement through attendance to groups. Assist pt to increase self awareness regarding mental health issues and expand helpful coping strategies. Will draw into conversation to assist in increasing comfort level and motivation.  Encourage asking for assistance when frustrated.  Assess further .

## 2017-01-04 NOTE — PROGRESS NOTES
"[facility], [place]   Psychiatric Progress Note        Interim History:   The patient's care was discussed with the treatment team during the daily team meeting and/or staff's chart notes were reviewed.  Staff report patient attending groups sporadically. Entitles and demanding. Receptive to redirection. Reported feeling anxious. Denied SI and DESTINI. Eating well. Social, engaged and visible. No overt psychosis, angel or confusion. Independent with ADL.     The patient noted that she is doing better. Denied feeling depressed \"I'm not depressed right now\", but feel anxious. Noted that she understands that she needs to adopt to new living environment. She denied SI and DESTINI. Slept well last night. Rumination subsided and denied racing thoughts and hallucinations. Tolerating medications well. C/o poor appetite but noted that she is eating well. Receptive to referral to day program.     Discussed medications and care plan.        Medications:       carvedilol  25 mg Oral BID w/meals     ascorbic acid  500 mg Oral Daily     aspirin chewable tablet 81 mg  81 mg Oral Daily     atorvastatin (LIPITOR) tablet 40 mg  40 mg Oral At Bedtime     clopidogrel (PLAVIX) tablet 75 mg  75 mg Oral Daily     conjugated estrogens  0.5-1 g Vaginal Once per day on Mon Wed Fri     hydrochlorothiazide (HYDRODIURIL) tablet 25 mg  25 mg Oral Daily     losartan (COZAAR) tablet 25 mg  25 mg Oral Daily     pantoprazole (PROTONIX) EC tablet 40 mg  40 mg Oral QAM AC     polyethylene glycol  17 g Oral Daily     mirtazapine  15 mg Oral At Bedtime     cholecalciferol  5,000 Units Oral Daily     ARIPiprazole  5 mg Oral Daily     DULoxetine  90 mg Oral Daily          Allergies:     Allergies   Allergen Reactions     Amlodipine Swelling     leg     Lisinopril Other (See Comments)     5 mg caused hypokalemia     Macrodantin [Nitrofurantoin] Hives     Penicillins Hives     Walnuts [Nuts] Swelling     Hard to breathe          Labs: "   No results found for this or any previous visit (from the past 24 hour(s)).       Psychiatric Examination:     Filed Vitals:    01/03/17 1836 01/04/17 0700 01/04/17 0848 01/04/17 1230   BP: 173/56 210/68 195/56 199/68   Pulse: 80 90 78 77   Temp:  98.7  F (37.1  C)  99.1  F (37.3  C)   TempSrc:  Tympanic  Tympanic   Resp:    16   Height:       Weight:         Weight is 129 lbs 12.8 oz  Body mass index is 23 kg/(m^2).    Appearance: appeared as age stated, well groomed, awake, alert and no apparent distress  Attitude:  cooperative  Eye Contact:  good  Mood:  anxious and better  Affect:  appropriate and in normal range and full range  Speech:  clear, coherent and normal prosody  Psychomotor Behavior:  no evidence of tardive dyskinesia, dystonia, or tics and intact station, gait and muscle tone  Throught Process:  linear and goal oriented  Associations:  no loose associations  Thought Content:  no evidence of suicidal ideation or homicidal ideation and no evidence of psychotic thought  Insight:  fair  Judgement:  intact  Oriented to:  time, person, and place  Attention Span and Concentration:  intact  Recent and Remote Memory:  intact         Precautions:     Behavioral Orders   Procedures     Code 1 - Restrict to Unit     Fall precautions     Routine Programming     As clinically indicated     Status 15     Every 15 minutes.     Suicide precautions          DIagnoses:   1.  Major depressive disorder, recurrent, severe, without psychotic features.   2.  Anxiety, not otherwise specified (major depressive disorder with prominent anxiety symptoms).   3.  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        Plan:     Medications:  1.  Abilify, increased from 4 to 5 mg daily.   2.  Cymbalta, increased from 60 to 90 mg daily.   3.  Remeron, continued at 15 mg at bedtime.   4.  PRN Ativan, lower from 1 mg q.6 h. to 0.5 mg q.6 h.   5.  PRN Risperdal was discontinued.   6.  PRN Zyprexa for agitation, Hydroxyzine for anxiety, and  Trazodone for insomnia will be offered.     Legal Status and Disposition:  1.   The patient was admitted on a volunt., status.   2. Disposition  to the [facility].  She is receptive to referral to day program versus seeing other outpatient senior programming.   The patient will likely be granted discharge once achieved mood stabilization, safety in the community, and medication tolerability.

## 2017-01-04 NOTE — PROGRESS NOTES
01/04/17 1400   Behavioral Health   Hallucinations denies / not responding to hallucinations   Thinking intact   Orientation person: oriented;place: oriented;date: oriented;time: oriented   Memory baseline memory   Insight admits / accepts   Judgement intact   Eye Contact at examiner   Affect sad;full range affect   Mood mood is calm;depressed   Physical Appearance/Attire attire appropriate to age and situation   Hygiene well groomed   Suicidality other (see comments)  (pt denies)   Self Injury other (see comment)  (pt denies)   Activity other (see comment)  (present in groups and milieu)   Speech clear;coherent   Medication Sensitivity no stated side effects;no observed side effects   Psychomotor / Gait steady;slow   Safety   Suicidality status 15   Fall fall (yellow) wristband   Coping/Psychosocial   Verbalized Emotional State depression;hopelessness   Plan Of Care Reviewed With patient   Patient Agreement with Plan of Care agrees   Psycho Education   Type of Intervention 1:1 intervention   Response participates, initiates socially appropriate   Hours 0.5   Treatment Detail (warning signs)   Group Therapy Session   Group Attendance attended group session   Activities of Daily Living   Hygiene/Grooming independent   Oral Hygiene independent   Dress independent   Room Organization independent   Activity   Activity Level of Assistance independent   Groups   Details (attended groups)   Behavioral Health Interventions   Depression maintain safety precautions;maintain safe secure environment;encourage nutrition and hydration;encourage participation / independence with adls;provide emotional support;establish therapeutic relationship;assist with developing and utilizing healthy coping strategies;build upon strengths;monitor need for prn medication;monitor confusion, memory loss, decision making ability and reorient / intervene as needed   Social and Therapeutic Interventions (Depression) encourage socialization with  "peers;encourage effective boundaries with peers;encourage participation in therapeutic groups and milieu activities     Pt was out in milieu and attending groups this shift. Pt stated needing rest since she \"didn't sleep well at [facility] on Monday.\" Pt denies SI/SIB, but endorses depression and anxiety surrounding her new housing and being overwhelmed. Pt ate well at both meals and has been social and appropriate with others. Pt affect sad, but full range when conversing with peers.    Illness Management Recovery model:  Warning Signs.    Patient identified the following early warning signs which may indicate that a relapse of their illness is startin. Not being social  2. Not getting out/isolating  3. Eating Less .      "

## 2017-01-04 NOTE — PLAN OF CARE
Problem: General Plan of Care (Inpatient Behavioral)  Goal: Individualization/Patient Specific Goal (IP Behavioral)  The patient and/or their representative will achieve their patient-specific goals related to the plan of care.    The patient-specific goals include:   Illness Management Recovery model:  Warning Signs.    Patient identified the following early warning signs which may indicate that a relapse of their illness is startin. Loss of appetite

## 2017-01-04 NOTE — PROGRESS NOTES
"Pt feels that she may have mistaken her feelings of hopelessness for have suicidal thoughts.  States it is likely that she may have exaggerated how she was feeling to draw attention to her distress.  States \"I was in crisis\" but didn't know what else to do.  Pt states that looking back on her phone call to her psychiatrist that resulted in her hospitalization, that she does have some regret.  States that afterward she thought that if she had a crisis line to call she would have done this but didn't know where she put the phone number.      Discussed difficulty adapting to [place].  Pt is stressed by  being very vocal about how unhappy he is with the AL.  She feels she is having to deal with her own disappointment and her husbands.  Laughed when asked if she had thought about telling her  how the complaining affects her and asking him to stop.  States that this hasn't been her role over the course of their marriage and she doesn't know how he would respond to that.      Pt aware that the dynamics of their relationship are changing due to 's dementia and that this change is difficult for both of them.  States she will consider speaking with him about supporting her by reducing complaining to her either during visit to hospital or with her therapist outpt.      Encouraged pt to spend time thinking about how to stay busy at home.  Aware that the many of the ways she occupied herself in her home aren't available in [place].  Has also lost interest in reading, which was a major hobby and knows she needs to find a replacement for this.  Plans to spend time thinking about this during admission and to take advantage of groups.  Was able to recall content of groups earlier today.      Pt ate 75% at dinner.  Ambulating with walker.  Spent 30 - 45 min walking in the [place] this evening.  Independent with ADLs.  Hygiene adequate.      Continues to deny UTI sx.      Reports visit with son and  went well but that " "\"they were both crabby.\"    "

## 2017-01-04 NOTE — PROGRESS NOTES
Behavioral Health  Note    Behavioral Health  Spirituality Group Note    UNIT 3BW    Name: Luna Hassan YOB: 1932   MRN: [id number] Age: 84 year old      Patient attended -led group, which included discussion of spirituality, coping with illness and building resilience.    Patient attended group for 1.0 hrs.    The patient actively participated in group discussion and patient demonstrated an appreciation of topic's application for their personal circumstances. Today's topic was Hope: Letting the light in.       Jeanie Agarwal  Chaplain Resident  Pager [phone]

## 2017-01-05 PROCEDURE — 25000132 ZZH RX MED GY IP 250 OP 250 PS 637: Mod: GY | Performed by: PHYSICIAN ASSISTANT

## 2017-01-05 PROCEDURE — 97150 GROUP THERAPEUTIC PROCEDURES: CPT | Mod: GO

## 2017-01-05 PROCEDURE — A9270 NON-COVERED ITEM OR SERVICE: HCPCS | Mod: GY | Performed by: PSYCHIATRY & NEUROLOGY

## 2017-01-05 PROCEDURE — A9270 NON-COVERED ITEM OR SERVICE: HCPCS | Mod: GY | Performed by: PHYSICIAN ASSISTANT

## 2017-01-05 PROCEDURE — A9270 NON-COVERED ITEM OR SERVICE: HCPCS | Mod: GY | Performed by: INTERNAL MEDICINE

## 2017-01-05 PROCEDURE — 90853 GROUP PSYCHOTHERAPY: CPT

## 2017-01-05 PROCEDURE — 12400002 ZZH R&B MH SENIOR/ADOLESCENT

## 2017-01-05 PROCEDURE — 25000132 ZZH RX MED GY IP 250 OP 250 PS 637: Mod: GY | Performed by: PSYCHIATRY & NEUROLOGY

## 2017-01-05 PROCEDURE — 25000132 ZZH RX MED GY IP 250 OP 250 PS 637: Mod: GY | Performed by: INTERNAL MEDICINE

## 2017-01-05 RX ADMIN — ARIPIPRAZOLE 5 MG: 5 TABLET ORAL at 08:11

## 2017-01-05 RX ADMIN — CARVEDILOL 25 MG: 25 TABLET, FILM COATED ORAL at 08:11

## 2017-01-05 RX ADMIN — MIRTAZAPINE 15 MG: 15 TABLET, FILM COATED ORAL at 20:40

## 2017-01-05 RX ADMIN — CLOPIDOGREL BISULFATE 75 MG: 75 TABLET, FILM COATED ORAL at 08:11

## 2017-01-05 RX ADMIN — DULOXETINE 90 MG: 60 CAPSULE, DELAYED RELEASE ORAL at 20:40

## 2017-01-05 RX ADMIN — PANTOPRAZOLE SODIUM 40 MG: 40 TABLET, DELAYED RELEASE ORAL at 06:52

## 2017-01-05 RX ADMIN — OXYCODONE HYDROCHLORIDE AND ACETAMINOPHEN 500 MG: 500 TABLET ORAL at 08:11

## 2017-01-05 RX ADMIN — HYDROCHLOROTHIAZIDE 25 MG: 25 TABLET ORAL at 08:11

## 2017-01-05 RX ADMIN — ATORVASTATIN CALCIUM 40 MG: 40 TABLET, FILM COATED ORAL at 20:40

## 2017-01-05 RX ADMIN — LOSARTAN POTASSIUM 25 MG: 25 TABLET, FILM COATED ORAL at 08:11

## 2017-01-05 RX ADMIN — ASPIRIN 81 MG CHEWABLE TABLET 81 MG: 81 TABLET CHEWABLE at 08:11

## 2017-01-05 RX ADMIN — CARVEDILOL 25 MG: 25 TABLET, FILM COATED ORAL at 18:20

## 2017-01-05 RX ADMIN — CHOLECALCIFEROL CAP 125 MCG (5000 UNIT) 5000 UNITS: 125 CAP at 08:10

## 2017-01-05 RX ADMIN — POLYETHYLENE GLYCOL 3350 17 G: 17 POWDER, FOR SOLUTION ORAL at 08:10

## 2017-01-05 ASSESSMENT — ACTIVITIES OF DAILY LIVING (ADL)
ORAL_HYGIENE: INDEPENDENT
GROOMING: INDEPENDENT
DRESS: INDEPENDENT
DRESS: INDEPENDENT
ORAL_HYGIENE: INDEPENDENT
GROOMING: INDEPENDENT

## 2017-01-05 NOTE — PROGRESS NOTES
Pt has been observed and writer has not seen any open mouth or signs of stroke or neurological problems. Son was concerned. B/P this am before am medications 216/72 sitting and 203/68 standing. No complaints of any headache. B/P recheck 174/50. P.83.

## 2017-01-05 NOTE — PROGRESS NOTES
"Spoke with pt's son Juan Carlos at [phone], who reports that his mom's face has changed over the past few months and wonders if this might be a concern. He reports that she often goes around with this \"open mouth look\" and is wondering if this could be due to something such as a stroke.  "

## 2017-01-05 NOTE — PLAN OF CARE
Problem: Depressive Symptoms  Goal: Depressive Symptoms  Signs and symptoms of listed problems will be absent or manageable.   Patient will report a decrease in depressive symptoms.  Patient will sleep 7-8 hours at night without much interruptions.  Patient will be out in the milieu and attend the programming offered on the unit.  Patient will be medication compliant.  Patient will eat 75% of meals provided in the unit.     Attended 2 of 2 OT groups. She was quicker to attend groups on invitation and initiated attendance on her own in a timely manner. Affect is a bit flat and is quiet until approached. She participated in activity focused on Aftercare though offered answers with some occasional vagueness and tangential content. She is quick to engage, offer direct eye contact and appear attentive when others are speaking whether it be to her or others. She works at a relaxed pace on activity requiring using problem solving with visuospatial concepts. She stated she has felt depressed for a couple months and seemed relieved to be receiving help at this time. She appears more interested in participating. Stated feeling very tired today and had not slept at all last night.

## 2017-01-05 NOTE — PROGRESS NOTES
Met with Suellen to review 55+ outpatient program and answer any questions. She has attended the program in the past and interested in returning. Discharge date not known at this time. Message left foe unit CTC.

## 2017-01-05 NOTE — PROGRESS NOTES
Pt continues to endorse depression and anxiety but lesser than yesterday, rated her depression 5/10 and anxiety 6/10.Denies any suicidal thoughts, feels safe at the unit.  Independent with ADL's and medication compliant.Gait is slow and steady with a wheeled walker.   /64 P 84, Asymptomatic, Coreg 25 mg given earlier.  8PM /42 P 82  Had a good visit with her  and son.

## 2017-01-05 NOTE — PLAN OF CARE
Problem: Depressive Symptoms  Goal: Depressive Symptoms  Signs and symptoms of listed problems will be absent or manageable.   Patient will report a decrease in depressive symptoms.  Patient will sleep 7-8 hours at night without much interruptions.  Patient will be out in the milieu and attend the programming offered on the unit.  Patient will be medication compliant.  Patient will eat 75% of meals provided in the unit.   Outcome: Improving  Pt reports a decrease in her depressive symptoms since admission to the hospital. Pt rates her depression at a 6/10 and  states her anxiety is still at a 8/10, slow to respond to questions. Pt was provided with coping skills for discharge.  Pt stated she did not sleep well last night and felt groggy today. Pt states she was not having problems sleeping at assisted living. Pt participates in groups. Medication complaint. Pt ate 75% of her breakfast and 75% of her lunch.

## 2017-01-06 PROCEDURE — 97150 GROUP THERAPEUTIC PROCEDURES: CPT | Mod: GO

## 2017-01-06 PROCEDURE — A9270 NON-COVERED ITEM OR SERVICE: HCPCS | Mod: GY | Performed by: PHYSICIAN ASSISTANT

## 2017-01-06 PROCEDURE — A9270 NON-COVERED ITEM OR SERVICE: HCPCS | Mod: GY | Performed by: PSYCHIATRY & NEUROLOGY

## 2017-01-06 PROCEDURE — 25000132 ZZH RX MED GY IP 250 OP 250 PS 637: Mod: GY | Performed by: PHYSICIAN ASSISTANT

## 2017-01-06 PROCEDURE — A9270 NON-COVERED ITEM OR SERVICE: HCPCS | Mod: GY | Performed by: INTERNAL MEDICINE

## 2017-01-06 PROCEDURE — 25000132 ZZH RX MED GY IP 250 OP 250 PS 637: Mod: GY | Performed by: PSYCHIATRY & NEUROLOGY

## 2017-01-06 PROCEDURE — 90853 GROUP PSYCHOTHERAPY: CPT

## 2017-01-06 PROCEDURE — 25000132 ZZH RX MED GY IP 250 OP 250 PS 637: Mod: GY | Performed by: INTERNAL MEDICINE

## 2017-01-06 PROCEDURE — 12400002 ZZH R&B MH SENIOR/ADOLESCENT

## 2017-01-06 RX ADMIN — ASPIRIN 81 MG CHEWABLE TABLET 81 MG: 81 TABLET CHEWABLE at 08:29

## 2017-01-06 RX ADMIN — PANTOPRAZOLE SODIUM 40 MG: 40 TABLET, DELAYED RELEASE ORAL at 06:42

## 2017-01-06 RX ADMIN — ATORVASTATIN CALCIUM 40 MG: 40 TABLET, FILM COATED ORAL at 20:44

## 2017-01-06 RX ADMIN — CONJUGATED ESTROGENS 1 G: 0.62 CREAM VAGINAL at 20:44

## 2017-01-06 RX ADMIN — ARIPIPRAZOLE 5 MG: 5 TABLET ORAL at 08:28

## 2017-01-06 RX ADMIN — DULOXETINE 90 MG: 60 CAPSULE, DELAYED RELEASE ORAL at 20:44

## 2017-01-06 RX ADMIN — MIRTAZAPINE 15 MG: 15 TABLET, FILM COATED ORAL at 20:44

## 2017-01-06 RX ADMIN — CLOPIDOGREL BISULFATE 75 MG: 75 TABLET, FILM COATED ORAL at 08:28

## 2017-01-06 RX ADMIN — CARVEDILOL 25 MG: 25 TABLET, FILM COATED ORAL at 17:26

## 2017-01-06 RX ADMIN — POLYETHYLENE GLYCOL 3350 17 G: 17 POWDER, FOR SOLUTION ORAL at 08:28

## 2017-01-06 RX ADMIN — CARVEDILOL 25 MG: 25 TABLET, FILM COATED ORAL at 08:28

## 2017-01-06 RX ADMIN — LOSARTAN POTASSIUM 25 MG: 25 TABLET, FILM COATED ORAL at 08:28

## 2017-01-06 RX ADMIN — CHOLECALCIFEROL CAP 125 MCG (5000 UNIT) 5000 UNITS: 125 CAP at 08:28

## 2017-01-06 RX ADMIN — HYDROCHLOROTHIAZIDE 25 MG: 25 TABLET ORAL at 08:28

## 2017-01-06 ASSESSMENT — ACTIVITIES OF DAILY LIVING (ADL)
GROOMING: INDEPENDENT
DRESS: INDEPENDENT
DRESS: STREET CLOTHES;WITH ASSISTANCE
ORAL_HYGIENE: INDEPENDENT
GROOMING: SHOWER;WITH ASSISTANCE
ORAL_HYGIENE: INDEPENDENT

## 2017-01-06 NOTE — PLAN OF CARE
"Problem: Depressive Symptoms  Goal: Depressive Symptoms  Signs and symptoms of listed problems will be absent or manageable.   Patient will report a decrease in depressive symptoms.  Patient will sleep 7-8 hours at night without much interruptions.  Patient will be out in the milieu and attend the programming offered on the unit.  Patient will be medication compliant.  Patient will eat 75% of meals provided in the unit.     Attended 2 of 2 OT groups. She stated feeling better and explained having slept better last night. Affect flat at times. She worked slowly on familiar step task requiring problem solving using visuospatial concepts. On approach to consider trying something new, chose to \"finish what I'm working on first\", which had been a slower pace. She participated in activity requiring quick thinking, offered creative answers in context. Seemed interested in being present, involved and comfortable with group.         "

## 2017-01-06 NOTE — PLAN OF CARE
Problem: Depressive Symptoms  Goal: Depressive Symptoms  Signs and symptoms of listed problems will be absent or manageable.   Patient will report a decrease in depressive symptoms.  Patient will sleep 7-8 hours at night without much interruptions.  Patient will be out in the milieu and attend the programming offered on the unit.  Patient will be medication compliant.  Patient will eat 75% of meals provided in the unit.   Outcome: Improving  48 HOUR NURSING ASSESSMENT:  Pt reports that her mood is improving. Pt is pleasant and cooperative during interactions. Pt denies depression this evening and is rating anxiety at 4/10 ( where 10 is the worst). Pt denies SI/SIB. Pt enjoyed a visit with her  and son this evening. Pt reports that her appetite is improving. Pt has been attending programming.  Pt however is reporting that she slept poorly last night and due to this has felt tired today. Pt has documented sleep of 5-7 hours of sleep in the last 48 hours with 7 hours being last night. Pt has been encouraged to let staff know if she is awake by either letting staff know during rounds, putting call light on or coming to the lounge. Pt has been informed that she does have prn trazodone as well as vistaril available if she is unable to sleep at night.   Pt is medication compliant. Pt has not used any prn medications in the last 48 hours.

## 2017-01-06 NOTE — PLAN OF CARE
Problem: General Plan of Care (Inpatient Behavioral)  Goal: Team Discussion  Team Plan:   BEHAVIORAL TEAM DISCUSSION    Continued Stay Criteria/Rationale: Suicidal ideation and depression  Plan: Psychiatric assessment, therapeutic milieu, medication management, individual and group support  Participants: Flower Petty RN, [facility]  Summary/Recommendation: The plan is to assess the patient for mental health and medication needs.  The patient will be prescribed medications to treat the identified symptoms.  Upon discharge the patient will be referred to services as appropriate based on the assessment.Pt was interested and referred to 55+ program.  Medical/Physical: See medical notes  Progress: Ongoing - improvement

## 2017-01-06 NOTE — PROGRESS NOTES
Facilitated a group on New Years Resolutions. We reflected on the year 2016 and made goals for 2017.  Handouts were provided a discussion was initiated. Pt reports that one thing she learned in 2016 was to appreciate her .

## 2017-01-07 PROCEDURE — A9270 NON-COVERED ITEM OR SERVICE: HCPCS | Mod: GY | Performed by: PSYCHIATRY & NEUROLOGY

## 2017-01-07 PROCEDURE — A9270 NON-COVERED ITEM OR SERVICE: HCPCS | Mod: GY | Performed by: PHYSICIAN ASSISTANT

## 2017-01-07 PROCEDURE — 25000132 ZZH RX MED GY IP 250 OP 250 PS 637: Mod: GY | Performed by: PSYCHIATRY & NEUROLOGY

## 2017-01-07 PROCEDURE — A9270 NON-COVERED ITEM OR SERVICE: HCPCS | Mod: GY | Performed by: INTERNAL MEDICINE

## 2017-01-07 PROCEDURE — 12400002 ZZH R&B MH SENIOR/ADOLESCENT

## 2017-01-07 PROCEDURE — 25000132 ZZH RX MED GY IP 250 OP 250 PS 637: Mod: GY | Performed by: PHYSICIAN ASSISTANT

## 2017-01-07 PROCEDURE — 25000132 ZZH RX MED GY IP 250 OP 250 PS 637: Mod: GY | Performed by: INTERNAL MEDICINE

## 2017-01-07 RX ADMIN — MIRTAZAPINE 15 MG: 15 TABLET, FILM COATED ORAL at 20:52

## 2017-01-07 RX ADMIN — CLOPIDOGREL BISULFATE 75 MG: 75 TABLET, FILM COATED ORAL at 08:15

## 2017-01-07 RX ADMIN — OXYCODONE HYDROCHLORIDE AND ACETAMINOPHEN 500 MG: 500 TABLET ORAL at 08:15

## 2017-01-07 RX ADMIN — HYDROXYZINE HYDROCHLORIDE 25 MG: 25 TABLET ORAL at 00:28

## 2017-01-07 RX ADMIN — ASPIRIN 81 MG CHEWABLE TABLET 81 MG: 81 TABLET CHEWABLE at 08:15

## 2017-01-07 RX ADMIN — PANTOPRAZOLE SODIUM 40 MG: 40 TABLET, DELAYED RELEASE ORAL at 06:39

## 2017-01-07 RX ADMIN — HYDROCHLOROTHIAZIDE 25 MG: 25 TABLET ORAL at 08:15

## 2017-01-07 RX ADMIN — CARVEDILOL 25 MG: 25 TABLET, FILM COATED ORAL at 08:15

## 2017-01-07 RX ADMIN — DULOXETINE 90 MG: 60 CAPSULE, DELAYED RELEASE ORAL at 20:52

## 2017-01-07 RX ADMIN — POLYETHYLENE GLYCOL 3350 17 G: 17 POWDER, FOR SOLUTION ORAL at 08:15

## 2017-01-07 RX ADMIN — LOSARTAN POTASSIUM 25 MG: 25 TABLET, FILM COATED ORAL at 08:15

## 2017-01-07 RX ADMIN — ARIPIPRAZOLE 5 MG: 5 TABLET ORAL at 08:15

## 2017-01-07 RX ADMIN — TRAZODONE HYDROCHLORIDE 50 MG: 50 TABLET ORAL at 00:28

## 2017-01-07 RX ADMIN — CHOLECALCIFEROL CAP 125 MCG (5000 UNIT) 5000 UNITS: 125 CAP at 08:15

## 2017-01-07 RX ADMIN — ATORVASTATIN CALCIUM 40 MG: 40 TABLET, FILM COATED ORAL at 20:52

## 2017-01-07 RX ADMIN — CARVEDILOL 25 MG: 25 TABLET, FILM COATED ORAL at 18:22

## 2017-01-07 ASSESSMENT — ACTIVITIES OF DAILY LIVING (ADL)
ORAL_HYGIENE: INDEPENDENT
GROOMING: HANDWASHING;INDEPENDENT
DRESS: STREET CLOTHES

## 2017-01-07 NOTE — PLAN OF CARE
"Problem: General Plan of Care (Inpatient Behavioral)  Goal: Team Discussion  Team Plan:   Outcome: No Change  BEHAVIORAL TEAM DISCUSSION    Calm, pleasant, cooperative. Alert and oriented x4. In the lounge most of the sh ift. Attended groups. Social with peers.   Rated depression as moderate, anxiety as minimal. Denies SI/SIB. Denies psychotic symptoms. Patient is willing to attend 55+ program; the only concern she had was with transportation. States her  is still driving but only short distances. Patient moe like to be discharge on Monday \"because there is laundry to be done and some other things\".   Patient ate about 50% of both meals.  Independent with cares.          "

## 2017-01-07 NOTE — PROGRESS NOTES
Pt had a good shift. Has been calm and pleasant, visible in milieu and attending groups but noted minimal interaction with peers. Reports is feeling better, affect is mid range. Per pt, depression and anxiety are improving as rates them at a 4, and 6 out of 10 respectively with 10 been the worst. Denies thoughts of SI/SIB. Reports good appetite and sleep. Denies pain or side effects of medications. Blood pressures remain high 177/44, 155/44, and 123/35 this shift. Pt is asymptomatic. Pt did shower and changed clothes with assist this shift.  Spouse and son visited, per pt visit went well. Pt is medication compliant.     01/06/17 2112   Behavioral Health   Hallucinations denies / not responding to hallucinations   Thinking intact   Orientation person: oriented;place: oriented;date: oriented   Memory baseline memory   Insight admits / accepts   Judgement intact   Eye Contact at examiner   Affect other (see comments)  (mid range)   Mood mood is calm   Physical Appearance/Attire attire appropriate to age and situation   Hygiene well groomed  (showered and changed clothing this shift)   Suicidality other (see comments)  (Pt currently denies)   Self Injury other (see comment)  (Pt denies)   Activity other (see comment)  (visible& in groups. Minimal interaction with peers)   Speech clear;coherent   Medication Sensitivity no observed side effects;no stated side effects   Psychomotor / Gait balanced;slow;steady

## 2017-01-08 PROCEDURE — 25000132 ZZH RX MED GY IP 250 OP 250 PS 637: Mod: GY | Performed by: PHYSICIAN ASSISTANT

## 2017-01-08 PROCEDURE — A9270 NON-COVERED ITEM OR SERVICE: HCPCS | Mod: GY | Performed by: PSYCHIATRY & NEUROLOGY

## 2017-01-08 PROCEDURE — 12400002 ZZH R&B MH SENIOR/ADOLESCENT

## 2017-01-08 PROCEDURE — 25000132 ZZH RX MED GY IP 250 OP 250 PS 637: Mod: GY | Performed by: INTERNAL MEDICINE

## 2017-01-08 PROCEDURE — 25000132 ZZH RX MED GY IP 250 OP 250 PS 637: Mod: GY | Performed by: PSYCHIATRY & NEUROLOGY

## 2017-01-08 PROCEDURE — A9270 NON-COVERED ITEM OR SERVICE: HCPCS | Mod: GY | Performed by: INTERNAL MEDICINE

## 2017-01-08 PROCEDURE — A9270 NON-COVERED ITEM OR SERVICE: HCPCS | Mod: GY | Performed by: PHYSICIAN ASSISTANT

## 2017-01-08 RX ADMIN — ARIPIPRAZOLE 5 MG: 5 TABLET ORAL at 08:25

## 2017-01-08 RX ADMIN — POLYETHYLENE GLYCOL 3350 17 G: 17 POWDER, FOR SOLUTION ORAL at 08:25

## 2017-01-08 RX ADMIN — HYDROCHLOROTHIAZIDE 25 MG: 25 TABLET ORAL at 08:25

## 2017-01-08 RX ADMIN — OXYCODONE HYDROCHLORIDE AND ACETAMINOPHEN 500 MG: 500 TABLET ORAL at 08:25

## 2017-01-08 RX ADMIN — LORAZEPAM 0.5 MG: 0.5 TABLET ORAL at 11:08

## 2017-01-08 RX ADMIN — LOSARTAN POTASSIUM 25 MG: 25 TABLET, FILM COATED ORAL at 08:25

## 2017-01-08 RX ADMIN — CLOPIDOGREL BISULFATE 75 MG: 75 TABLET, FILM COATED ORAL at 08:25

## 2017-01-08 RX ADMIN — CHOLECALCIFEROL CAP 125 MCG (5000 UNIT) 5000 UNITS: 125 CAP at 08:25

## 2017-01-08 RX ADMIN — ASPIRIN 81 MG CHEWABLE TABLET 81 MG: 81 TABLET CHEWABLE at 08:25

## 2017-01-08 RX ADMIN — MIRTAZAPINE 15 MG: 15 TABLET, FILM COATED ORAL at 21:29

## 2017-01-08 RX ADMIN — CARVEDILOL 25 MG: 25 TABLET, FILM COATED ORAL at 19:10

## 2017-01-08 RX ADMIN — DULOXETINE 90 MG: 60 CAPSULE, DELAYED RELEASE ORAL at 21:29

## 2017-01-08 RX ADMIN — PANTOPRAZOLE SODIUM 40 MG: 40 TABLET, DELAYED RELEASE ORAL at 06:46

## 2017-01-08 RX ADMIN — CARVEDILOL 25 MG: 25 TABLET, FILM COATED ORAL at 08:25

## 2017-01-08 RX ADMIN — ATORVASTATIN CALCIUM 40 MG: 40 TABLET, FILM COATED ORAL at 21:29

## 2017-01-08 ASSESSMENT — ACTIVITIES OF DAILY LIVING (ADL)
ORAL_HYGIENE: INDEPENDENT
GROOMING: INDEPENDENT
DRESS: STREET CLOTHES

## 2017-01-08 NOTE — PROGRESS NOTES
01/08/17 1509   Behavioral Health   Hallucinations denies / not responding to hallucinations   Thinking confused;poor concentration   Orientation person: oriented;place: oriented   Memory baseline memory   Insight poor   Judgement impaired   Affect sad   Mood anxious;depressed   Physical Appearance/Attire attire appropriate to age and situation   Hygiene well groomed   Self Injury other (see comment)  (denied)   Activity other (see comment)  (denied)   Speech clear;coherent   Psychomotor / Gait balanced;slow;steady   Coping/Psychosocial   Verbalized Emotional State depression;anxiety   Plan Of Care Reviewed With patient   Psycho Education   Type of Intervention structured groups   Response participates with encouragement   Hours 0.5   Treatment Detail SR and PLANNING   Activities of Daily Living   Hygiene/Grooming independent   Oral Hygiene independent   Dress street clothes   Room Organization independent   Activity   Activity Level of Assistance independent   Behavioral Health Interventions   Depression maintain safety precautions;maintain safe secure environment   Social and Therapeutic Interventions (Depression) encourage socialization with peers;encourage participation in therapeutic groups and milieu activities     Pt denied suicidal ideations and hallucinations. Pt reported feeling confused, forgetful, and having poor concentration. Pt only stated these symptoms when her family arrived on the unit, before then, it was stated that she was fine complaining of no issues. Pt attended groups but did not participate. No concerns or issues to report otherwise.

## 2017-01-08 NOTE — PROGRESS NOTES
"More anxious, quiet and isolative today. States she worries about discharge, she wants to go but doesn't feel ready. Described her anxiety as \"emptiness in my stomach and jitteriness\". Accepted prn medication, opted to take Lorazepam instead of Hydroxyzine. Talked to her about the effect of benzodiazepine on memory and their addictive qualities. Patient will ask for Hydroxazine next time she needs antianxiety med.   Patient felt much better after about an hour of taking lorazepam. She was smiling and socializing with her peers. Ate well.      15:00 Staff reported that patient complained of having diarrhea all day, seemed anxious and confused while her family was visiting. Talked to pt about it, states she had 2-3 lose stools after lunch and is feeling nauseated. Encouraged her to show the next BM to staff.   "

## 2017-01-08 NOTE — PROGRESS NOTES
Pt reported doing a lot better. She attended and participated in group. Pt was social and cooperatives. Affect is brighter.

## 2017-01-09 PROCEDURE — A9270 NON-COVERED ITEM OR SERVICE: HCPCS | Mod: GY | Performed by: PHYSICIAN ASSISTANT

## 2017-01-09 PROCEDURE — 25000132 ZZH RX MED GY IP 250 OP 250 PS 637: Mod: GY | Performed by: PSYCHIATRY & NEUROLOGY

## 2017-01-09 PROCEDURE — 12400002 ZZH R&B MH SENIOR/ADOLESCENT

## 2017-01-09 PROCEDURE — A9270 NON-COVERED ITEM OR SERVICE: HCPCS | Mod: GY | Performed by: PSYCHIATRY & NEUROLOGY

## 2017-01-09 PROCEDURE — 99231 SBSQ HOSP IP/OBS SF/LOW 25: CPT | Performed by: PSYCHIATRY & NEUROLOGY

## 2017-01-09 PROCEDURE — A9270 NON-COVERED ITEM OR SERVICE: HCPCS | Mod: GY | Performed by: INTERNAL MEDICINE

## 2017-01-09 PROCEDURE — 90853 GROUP PSYCHOTHERAPY: CPT

## 2017-01-09 PROCEDURE — 25000132 ZZH RX MED GY IP 250 OP 250 PS 637: Mod: GY | Performed by: PHYSICIAN ASSISTANT

## 2017-01-09 PROCEDURE — 97150 GROUP THERAPEUTIC PROCEDURES: CPT | Mod: GO

## 2017-01-09 PROCEDURE — 25000132 ZZH RX MED GY IP 250 OP 250 PS 637: Mod: GY | Performed by: INTERNAL MEDICINE

## 2017-01-09 RX ADMIN — ASPIRIN 81 MG CHEWABLE TABLET 81 MG: 81 TABLET CHEWABLE at 08:34

## 2017-01-09 RX ADMIN — CARVEDILOL 25 MG: 25 TABLET, FILM COATED ORAL at 08:35

## 2017-01-09 RX ADMIN — MIRTAZAPINE 15 MG: 15 TABLET, FILM COATED ORAL at 20:43

## 2017-01-09 RX ADMIN — CHOLECALCIFEROL CAP 125 MCG (5000 UNIT) 5000 UNITS: 125 CAP at 08:34

## 2017-01-09 RX ADMIN — CLOPIDOGREL BISULFATE 75 MG: 75 TABLET, FILM COATED ORAL at 08:34

## 2017-01-09 RX ADMIN — ARIPIPRAZOLE 5 MG: 5 TABLET ORAL at 08:35

## 2017-01-09 RX ADMIN — HYDROXYZINE HYDROCHLORIDE 25 MG: 25 TABLET ORAL at 16:41

## 2017-01-09 RX ADMIN — CONJUGATED ESTROGENS 0.5 G: 0.62 CREAM VAGINAL at 20:44

## 2017-01-09 RX ADMIN — HYDROCHLOROTHIAZIDE 25 MG: 25 TABLET ORAL at 08:35

## 2017-01-09 RX ADMIN — POLYETHYLENE GLYCOL 3350 17 G: 17 POWDER, FOR SOLUTION ORAL at 08:32

## 2017-01-09 RX ADMIN — DULOXETINE 90 MG: 60 CAPSULE, DELAYED RELEASE ORAL at 20:43

## 2017-01-09 RX ADMIN — PANTOPRAZOLE SODIUM 40 MG: 40 TABLET, DELAYED RELEASE ORAL at 06:38

## 2017-01-09 RX ADMIN — LOSARTAN POTASSIUM 25 MG: 25 TABLET, FILM COATED ORAL at 08:36

## 2017-01-09 RX ADMIN — ATORVASTATIN CALCIUM 40 MG: 40 TABLET, FILM COATED ORAL at 20:43

## 2017-01-09 RX ADMIN — CARVEDILOL 25 MG: 25 TABLET, FILM COATED ORAL at 20:43

## 2017-01-09 ASSESSMENT — ACTIVITIES OF DAILY LIVING (ADL)
ORAL_HYGIENE: INDEPENDENT
ORAL_HYGIENE: INDEPENDENT
GROOMING: INDEPENDENT
DRESS: INDEPENDENT
GROOMING: INDEPENDENT
DRESS: INDEPENDENT

## 2017-01-09 NOTE — PROGRESS NOTES
Pt is pleasant, cooperative and appreciative during interactions. Pt is rating her depression at 5/10 and anxiety at 6/10 this Morning. Pt denies SI/SIB. Pt reports sleep and appetite have improved since admission. Pt's affect remains flat.

## 2017-01-09 NOTE — PROGRESS NOTES
01/08/17 2200   Behavioral Health   Thinking intact   Orientation person: oriented;place: oriented   Affect blunted, flat;sad   Mood depressed;mood is calm   Physical Appearance/Attire attire appropriate to age and situation   Hygiene well groomed   Speech clear;coherent   Psychomotor / Gait slow;balanced     Patient was isolative most of evening. Made some phone calls and was seated in lounge for end of movie. Did not interact much with staff and other patients. Patient appeared calm but sad. Was not able to check-in before patient went to bed.

## 2017-01-09 NOTE — PLAN OF CARE
Problem: Depressive Symptoms  Goal: Depressive Symptoms  Signs and symptoms of listed problems will be absent or manageable.   Patient will report a decrease in depressive symptoms.  Patient will sleep 7-8 hours at night without much interruptions.  Patient will be out in the milieu and attend the programming offered on the unit.  Patient will be medication compliant.  Patient will eat 75% of meals provided in the unit.   Attended 2 of 2 OT groups. Was social and pleasant on approach. Worked slowly on familiar step task. Affect was flat though seemed attentive and interested in being involved. Made decisions and followed through on them. Spoke up without cues at her turn during activity focused on Positive Thinking. She identified several positive ideas.

## 2017-01-09 NOTE — PLAN OF CARE
Problem: Depressive Symptoms  Goal: Depressive Symptoms  Signs and symptoms of listed problems will be absent or manageable.   Patient will report a decrease in depressive symptoms.  Patient will sleep 7-8 hours at night without much interruptions.  Patient will be out in the milieu and attend the programming offered on the unit.  Patient will be medication compliant.  Patient will eat 75% of meals provided in the unit.   Outcome: Improving  48 HOUR NURSING ASSESSMENT:  Pt has been pleasant and cooperative. Pt has been attending programming and is interactive with select peers. Pt continues to report feeling depressed (5/10) and anxiety (6/10). Pt denies SI/SIB, Pt reports that she feels that her mood has improved since admission. Pt feels that her sleep and appetite have improved.  Pt has been medication compliant. In the last 48 hours patient has used prn ativan 0.5 mg x 1.

## 2017-01-09 NOTE — PROGRESS NOTES
"[facility], [place]   Psychiatric Progress Note        Interim History:   The patient's care was discussed with the treatment team during the daily team meeting and/or staff's chart notes were reviewed.  Staff report patient is bright, engaged and social with peers. Sleeping well. No overt psychosis, angel or confusion. Compliant with medications and attending groups. Somatic and attention seeking but easily redirectable. Rated dep and anx at 5-6 out of 10. Attending groups. Denied SI and DESTINI. Eating well.  Independent with ADL.     The patient noted that she is doing well. Concerned about returning home and worried about \"how am I going to mange at home\", receptive to referral to day program. Future oriented and denied SI and DESTINI. Eating well. Slept well last night. Rumination subsided and denied racing thoughts and irritability. No hallucinations, confusion or paranoia. Tolerating medications well.     Discussed medications and care plan.        Medications:       carvedilol  25 mg Oral BID w/meals     ascorbic acid  500 mg Oral Daily     aspirin chewable tablet 81 mg  81 mg Oral Daily     atorvastatin (LIPITOR) tablet 40 mg  40 mg Oral At Bedtime     clopidogrel (PLAVIX) tablet 75 mg  75 mg Oral Daily     conjugated estrogens  0.5-1 g Vaginal Once per day on Mon Wed Fri     hydrochlorothiazide (HYDRODIURIL) tablet 25 mg  25 mg Oral Daily     losartan (COZAAR) tablet 25 mg  25 mg Oral Daily     pantoprazole (PROTONIX) EC tablet 40 mg  40 mg Oral QAM AC     polyethylene glycol  17 g Oral Daily     mirtazapine  15 mg Oral At Bedtime     cholecalciferol  5,000 Units Oral Daily     ARIPiprazole  5 mg Oral Daily     DULoxetine  90 mg Oral Daily          Allergies:     Allergies   Allergen Reactions     Amlodipine Swelling     leg     Lisinopril Other (See Comments)     5 mg caused hypokalemia     Macrodantin [Nitrofurantoin] Hives     Penicillins Hives     Walnuts [Nuts] Swelling     Hard " to breathe          Labs:   No results found for this or any previous visit (from the past 24 hour(s)).       Psychiatric Examination:     Filed Vitals:    01/07/17 1707 01/08/17 0839 01/08/17 1630 01/09/17 0850   BP: 168/41 177/48  190/50   Pulse: 69 74  76   Temp: 96.3  F (35.7  C) 98.2  F (36.8  C) 97.6  F (36.4  C) 98  F (36.7  C)   TempSrc:  Tympanic Oral Oral   Resp:  16 16 16   Height:       Weight:  58.378 kg (128 lb 11.2 oz)       Weight is 128 lbs 11.2 oz  Body mass index is 22.8 kg/(m^2).    Appearance: appeared as age stated, well groomed, awake, alert and no apparent distress  Attitude:  cooperative  Eye Contact:  good  Mood:  anxious and better  Affect:  appropriate and in normal range, full range and bright and engaged.   Speech:  clear, coherent and normal prosody  Psychomotor Behavior:  no evidence of tardive dyskinesia, dystonia, or tics and intact station, gait and muscle tone  Throught Process:  linear and goal oriented  Associations:  no loose associations  Thought Content:  no evidence of suicidal ideation or homicidal ideation and no evidence of psychotic thought  Insight:  fair  Judgement:  intact  Oriented to:  time, person, and place  Attention Span and Concentration:  intact  Recent and Remote Memory:  intact         Precautions:     Behavioral Orders   Procedures     Code 1 - Restrict to Unit     Fall precautions     Routine Programming     As clinically indicated     Status 15     Every 15 minutes.     Suicide precautions          DIagnoses:   1.  Major depressive disorder, recurrent, severe, without psychotic features.   2.  Anxiety, not otherwise specified (major depressive disorder with prominent anxiety symptoms).   3.  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        Plan:     Medications:  1.  Abilify, increased from 4 to 5 mg daily.   2.  Cymbalta, increased from 60 to 90 mg daily.   3.  Remeron, continued at 15 mg at bedtime.   4.  PRN Ativan, lower from 1 mg q.6 h. to 0.5 mg q.6  h.   5.  PRN Risperdal was discontinued.   6.  PRN Zyprexa for agitation, Hydroxyzine for anxiety, and Trazodone for insomnia will be offered.     Legal Status and Disposition:  1.   The patient was admitted on a volunt., status.   2. Disposition  to the [facility].  She referred to 55+ program.   The patient will likely be granted discharge once achieved mood stabilization, safety in the community, and medication tolerability. Likely discharge tomorrow if symptoms continue to improve at current rate.

## 2017-01-09 NOTE — PROGRESS NOTES
Writer talked to patient about discharging tomorrow and plans to go to 55+ program. Writer inquired if pt needs transportation to the program. Pt reported that her  can drive her.

## 2017-01-10 PROCEDURE — 25000132 ZZH RX MED GY IP 250 OP 250 PS 637: Mod: GY | Performed by: INTERNAL MEDICINE

## 2017-01-10 PROCEDURE — 25000132 ZZH RX MED GY IP 250 OP 250 PS 637: Mod: GY | Performed by: PSYCHIATRY & NEUROLOGY

## 2017-01-10 PROCEDURE — 97150 GROUP THERAPEUTIC PROCEDURES: CPT | Mod: GO

## 2017-01-10 PROCEDURE — 12400002 ZZH R&B MH SENIOR/ADOLESCENT

## 2017-01-10 PROCEDURE — 90853 GROUP PSYCHOTHERAPY: CPT

## 2017-01-10 PROCEDURE — 99231 SBSQ HOSP IP/OBS SF/LOW 25: CPT | Performed by: PSYCHIATRY & NEUROLOGY

## 2017-01-10 PROCEDURE — A9270 NON-COVERED ITEM OR SERVICE: HCPCS | Mod: GY | Performed by: PSYCHIATRY & NEUROLOGY

## 2017-01-10 PROCEDURE — A9270 NON-COVERED ITEM OR SERVICE: HCPCS | Mod: GY | Performed by: PHYSICIAN ASSISTANT

## 2017-01-10 PROCEDURE — 25000132 ZZH RX MED GY IP 250 OP 250 PS 637: Mod: GY | Performed by: PHYSICIAN ASSISTANT

## 2017-01-10 PROCEDURE — A9270 NON-COVERED ITEM OR SERVICE: HCPCS | Mod: GY | Performed by: INTERNAL MEDICINE

## 2017-01-10 RX ORDER — DULOXETIN HYDROCHLORIDE 30 MG/1
90 CAPSULE, DELAYED RELEASE ORAL DAILY
Qty: 90 CAPSULE | Refills: 0 | Status: SHIPPED | OUTPATIENT
Start: 2017-01-10 | End: 2017-02-09

## 2017-01-10 RX ORDER — ARIPIPRAZOLE 5 MG/1
5 TABLET ORAL DAILY
Qty: 30 TABLET | Refills: 0 | Status: SHIPPED | OUTPATIENT
Start: 2017-01-10 | End: 2017-02-09

## 2017-01-10 RX ORDER — LORAZEPAM 0.5 MG/1
0.5 TABLET ORAL EVERY 6 HOURS PRN
Qty: 30 TABLET | Refills: 0 | Status: SHIPPED | OUTPATIENT
Start: 2017-01-10

## 2017-01-10 RX ORDER — MIRTAZAPINE 15 MG/1
15 TABLET, FILM COATED ORAL AT BEDTIME
Qty: 30 TABLET | Refills: 0 | Status: SHIPPED | OUTPATIENT
Start: 2017-01-10 | End: 2017-02-09

## 2017-01-10 RX ORDER — TRAZODONE HYDROCHLORIDE 50 MG/1
50 TABLET, FILM COATED ORAL
Qty: 30 TABLET | Refills: 0 | Status: SHIPPED | OUTPATIENT
Start: 2017-01-10 | End: 2017-02-09

## 2017-01-10 RX ADMIN — POLYETHYLENE GLYCOL 3350 17 G: 17 POWDER, FOR SOLUTION ORAL at 08:37

## 2017-01-10 RX ADMIN — CHOLECALCIFEROL CAP 125 MCG (5000 UNIT) 5000 UNITS: 125 CAP at 09:22

## 2017-01-10 RX ADMIN — ATORVASTATIN CALCIUM 40 MG: 40 TABLET, FILM COATED ORAL at 21:37

## 2017-01-10 RX ADMIN — TRAZODONE HYDROCHLORIDE 50 MG: 50 TABLET ORAL at 02:26

## 2017-01-10 RX ADMIN — LOSARTAN POTASSIUM 25 MG: 25 TABLET, FILM COATED ORAL at 08:38

## 2017-01-10 RX ADMIN — HYDROXYZINE HYDROCHLORIDE 25 MG: 25 TABLET ORAL at 02:26

## 2017-01-10 RX ADMIN — HYDROCHLOROTHIAZIDE 25 MG: 25 TABLET ORAL at 08:40

## 2017-01-10 RX ADMIN — CARVEDILOL 25 MG: 25 TABLET, FILM COATED ORAL at 08:39

## 2017-01-10 RX ADMIN — DULOXETINE 90 MG: 60 CAPSULE, DELAYED RELEASE ORAL at 21:37

## 2017-01-10 RX ADMIN — ARIPIPRAZOLE 5 MG: 5 TABLET ORAL at 09:21

## 2017-01-10 RX ADMIN — ASPIRIN 81 MG CHEWABLE TABLET 81 MG: 81 TABLET CHEWABLE at 08:39

## 2017-01-10 RX ADMIN — MIRTAZAPINE 15 MG: 15 TABLET, FILM COATED ORAL at 21:37

## 2017-01-10 RX ADMIN — CARVEDILOL 25 MG: 25 TABLET, FILM COATED ORAL at 18:42

## 2017-01-10 RX ADMIN — PANTOPRAZOLE SODIUM 40 MG: 40 TABLET, DELAYED RELEASE ORAL at 06:49

## 2017-01-10 RX ADMIN — CLOPIDOGREL BISULFATE 75 MG: 75 TABLET, FILM COATED ORAL at 08:39

## 2017-01-10 ASSESSMENT — ACTIVITIES OF DAILY LIVING (ADL)
GROOMING: INDEPENDENT
LAUNDRY: WITH SUPERVISION
DRESS: INDEPENDENT
DRESS: INDEPENDENT
GROOMING: INDEPENDENT
ORAL_HYGIENE: INDEPENDENT
ORAL_HYGIENE: INDEPENDENT

## 2017-01-10 NOTE — PROGRESS NOTES
01/10/17 1436   Behavioral Health   Hallucinations denies / not responding to hallucinations   Thinking poor concentration;distractable   Orientation person: oriented;place: oriented;date: oriented;time: oriented   Memory baseline memory   Insight admits / accepts   Judgement intact   Eye Contact at examiner   Affect sad   Mood mood is calm;anxious   Physical Appearance/Attire neat   Hygiene well groomed   Suicidality other (see comments)  (denied)   Self Injury other (see comment)  (denied)   Activity withdrawn   Speech clear;coherent   Psychomotor / Gait steady;slow   Coping/Psychosocial   Verbalized Emotional State depression;anxiety;acceptance   Plan Of Care Reviewed With patient   Patient Agreement with Plan of Care agrees   Psycho Education   Type of Intervention structured groups   Response participates, initiates socially appropriate   Hours 0.5   Treatment Detail Triggers   Activities of Daily Living   Hygiene/Grooming independent   Oral Hygiene independent   Dress independent   Laundry with supervision   Room Organization independent   Behavioral Health Interventions   Depression maintain safety precautions;maintain safe secure environment   Social and Therapeutic Interventions (Depression) encourage socialization with peers;encourage participation in therapeutic groups and milieu activities     Pt denied suicidal ideations and hallucinations. Pt reported waking up feeling anxious but not sure what triggered it. Pt is feeling good about discharge and just wanting to get everything sorted out for preparing to leave. No concerns or issues to report otherwise.

## 2017-01-10 NOTE — PROGRESS NOTES
[facility], [place]   Psychiatric Progress Note        Interim History:   The patient's care was discussed with the treatment team during the daily team meeting and/or staff's chart notes were reviewed.  Staff report patient is bright, engaged and social with peers. Reported improvement in depression and anxiety. No SI or DESTINI. Slept 5.5hrs. Eating well. attending groups and participating. No overt psychosis, angel or confusion. Compliant with medications and care.  Less somatic and less attention seeking. Eating well.  Independent with ADL.     The patient noted that she is doing better. Depression and anxiety improved. No SI or DESTINI. No hallucinations, racing thoughts or paranoia. Slept well. Appetite intact. Tolerating medications well. More future oriented and noted that rumination subsided. Oriented and denied confusion.     Discussed medications and care plan.        Medications:       carvedilol  25 mg Oral BID w/meals     ascorbic acid  500 mg Oral Daily     aspirin chewable tablet 81 mg  81 mg Oral Daily     atorvastatin (LIPITOR) tablet 40 mg  40 mg Oral At Bedtime     clopidogrel (PLAVIX) tablet 75 mg  75 mg Oral Daily     conjugated estrogens  0.5-1 g Vaginal Once per day on Mon Wed Fri     hydrochlorothiazide (HYDRODIURIL) tablet 25 mg  25 mg Oral Daily     losartan (COZAAR) tablet 25 mg  25 mg Oral Daily     pantoprazole (PROTONIX) EC tablet 40 mg  40 mg Oral QAM AC     polyethylene glycol  17 g Oral Daily     mirtazapine  15 mg Oral At Bedtime     cholecalciferol  5,000 Units Oral Daily     ARIPiprazole  5 mg Oral Daily     DULoxetine  90 mg Oral Daily          Allergies:     Allergies   Allergen Reactions     Amlodipine Swelling     leg     Lisinopril Other (See Comments)     5 mg caused hypokalemia     Macrodantin [Nitrofurantoin] Hives     Penicillins Hives     Walnuts [Nuts] Swelling     Hard to breathe          Labs:   No results found for this or any previous visit  (from the past 24 hour(s)).       Psychiatric Examination:     Filed Vitals:    01/08/17 1630 01/09/17 0850 01/09/17 1900 01/10/17 0808   BP:  190/50 169/47 176/45   Pulse:  76 71 68   Temp: 97.6  F (36.4  C) 98  F (36.7  C)  96.7  F (35.9  C)   TempSrc: Oral Oral  Tympanic   Resp: 16 16  16   Height:       Weight:    58.65 kg (129 lb 4.8 oz)     Weight is 129 lbs 4.8 oz  Body mass index is 22.91 kg/(m^2).    Appearance: appeared as age stated, well groomed, awake, alert and no apparent distress  Attitude:  cooperative  Eye Contact:  good  Mood:  better  Affect:  appropriate and in normal range, full range and bright and engaged.   Speech:  clear, coherent and normal prosody  Psychomotor Behavior:  no evidence of tardive dyskinesia, dystonia, or tics and intact station, gait and muscle tone  Throught Process:  linear and goal oriented  Associations:  no loose associations  Thought Content:  no evidence of suicidal ideation or homicidal ideation and no evidence of psychotic thought  Insight:  fair  Judgement:  intact  Oriented to:  time, person, and place  Attention Span and Concentration:  intact  Recent and Remote Memory:  intact         Precautions:     Behavioral Orders   Procedures     Behavior Outpatient Eval     Code 1 - Restrict to Unit     Fall precautions     Routine Programming     As clinically indicated     Status 15     Every 15 minutes.     Suicide precautions          DIagnoses:   1.  Major depressive disorder, recurrent, severe, without psychotic features.   2.  Anxiety, not otherwise specified (major depressive disorder with prominent anxiety symptoms).   3.  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        Plan:     Medications:  1.  Abilify, increased from 4 to 5 mg daily.   2.  Cymbalta, increased from 60 to 90 mg daily.   3.  Remeron, continued at 15 mg at bedtime.   4.  PRN Ativan, lower from 1 mg q.6 h. to 0.5 mg q.6 h.   5.  PRN Risperdal was discontinued.   6.  PRN Zyprexa for agitation,  Hydroxyzine for anxiety, and Trazodone for insomnia will be offered.     Legal Status and Disposition:  1.   The patient was admitted on a volunt., status.   2. Disposition  to the [facility].  She referred to 55+ [facility].   The patient will likely be granted discharge once achieved mood stabilization, safety in the community, and medication tolerability. Likely discharge tomorrow if symptoms continue to improve at current rate. Discharge medications ordered.

## 2017-01-10 NOTE — PROGRESS NOTES
Patient got up and went to the desk to ask something for sleep.  Trazodone 50 mgs and Hydroxyzine 25 mgs given as PRN for   insomnia and anxiety respectively.

## 2017-01-10 NOTE — PLAN OF CARE
Problem: Depressive Symptoms  Goal: Depressive Symptoms  Signs and symptoms of listed problems will be absent or manageable.   Patient will report a decrease in depressive symptoms.  Patient will sleep 7-8 hours at night without much interruptions.  Patient will be out in the milieu and attend the programming offered on the unit.  Patient will be medication compliant.  Patient will eat 75% of meals provided in the unit.     Attended 2 of 2 OT groups. Made an effort to initiate comments to a peer. Worked independently on 1 step task. Was assertive in asking questions when seeming to have them. Affect continues to appear somewhat serious though she is quick to add elaboration on comments she is explaining. She participated in activity focused on music, reminiscing and the benefits of the energy it can impact on the mood. She offered occasional comments during this activity, seemed attentive and involved.

## 2017-01-10 NOTE — PROGRESS NOTES
Gave pt application for [facility] [employer] to complete. Helped pt complete form. Mailed to [facility] [employer].    Pt will discharge tomorrow.  Notified son.

## 2017-01-10 NOTE — PROGRESS NOTES
Pt attended and participated in group. Pt reported doing okay but a little tired. Pt stated her mood is bad because her sons will not be able to visit today because of the snow. That she feels bad for all the people driving in the snow, and call that a trigger for her. Pt is pleasant on approach, she is cooperative, affect is brighter.

## 2017-01-11 VITALS
HEIGHT: 63 IN | RESPIRATION RATE: 16 BRPM | HEART RATE: 72 BPM | DIASTOLIC BLOOD PRESSURE: 55 MMHG | WEIGHT: 129.3 LBS | SYSTOLIC BLOOD PRESSURE: 187 MMHG | BODY MASS INDEX: 22.91 KG/M2 | TEMPERATURE: 97.2 F

## 2017-01-11 PROCEDURE — 25000132 ZZH RX MED GY IP 250 OP 250 PS 637: Mod: GY | Performed by: PHYSICIAN ASSISTANT

## 2017-01-11 PROCEDURE — A9270 NON-COVERED ITEM OR SERVICE: HCPCS | Mod: GY | Performed by: PSYCHIATRY & NEUROLOGY

## 2017-01-11 PROCEDURE — A9270 NON-COVERED ITEM OR SERVICE: HCPCS | Mod: GY | Performed by: PHYSICIAN ASSISTANT

## 2017-01-11 PROCEDURE — A9270 NON-COVERED ITEM OR SERVICE: HCPCS | Mod: GY | Performed by: INTERNAL MEDICINE

## 2017-01-11 PROCEDURE — 25000132 ZZH RX MED GY IP 250 OP 250 PS 637: Mod: GY | Performed by: PSYCHIATRY & NEUROLOGY

## 2017-01-11 PROCEDURE — 97150 GROUP THERAPEUTIC PROCEDURES: CPT | Mod: GO

## 2017-01-11 PROCEDURE — 25000132 ZZH RX MED GY IP 250 OP 250 PS 637: Mod: GY | Performed by: INTERNAL MEDICINE

## 2017-01-11 PROCEDURE — 99238 HOSP IP/OBS DSCHRG MGMT 30/<: CPT | Performed by: PSYCHIATRY & NEUROLOGY

## 2017-01-11 RX ORDER — CARVEDILOL 25 MG/1
25 TABLET ORAL 2 TIMES DAILY WITH MEALS
Qty: 60 TABLET | Refills: 0 | Status: SHIPPED | OUTPATIENT
Start: 2017-01-11

## 2017-01-11 RX ORDER — CARVEDILOL 3.12 MG/1
25 TABLET ORAL 2 TIMES DAILY WITH MEALS
Qty: 60 TABLET | Refills: 0 | Status: SHIPPED | OUTPATIENT
Start: 2017-01-11 | End: 2017-01-11

## 2017-01-11 RX ADMIN — CHOLECALCIFEROL CAP 125 MCG (5000 UNIT) 5000 UNITS: 125 CAP at 08:51

## 2017-01-11 RX ADMIN — ASPIRIN 81 MG CHEWABLE TABLET 81 MG: 81 TABLET CHEWABLE at 08:51

## 2017-01-11 RX ADMIN — CLOPIDOGREL BISULFATE 75 MG: 75 TABLET, FILM COATED ORAL at 08:51

## 2017-01-11 RX ADMIN — HYDROCHLOROTHIAZIDE 25 MG: 25 TABLET ORAL at 08:51

## 2017-01-11 RX ADMIN — POLYETHYLENE GLYCOL 3350 17 G: 17 POWDER, FOR SOLUTION ORAL at 08:50

## 2017-01-11 RX ADMIN — LOSARTAN POTASSIUM 25 MG: 25 TABLET, FILM COATED ORAL at 08:51

## 2017-01-11 RX ADMIN — PANTOPRAZOLE SODIUM 40 MG: 40 TABLET, DELAYED RELEASE ORAL at 06:43

## 2017-01-11 RX ADMIN — CARVEDILOL 25 MG: 25 TABLET, FILM COATED ORAL at 17:58

## 2017-01-11 RX ADMIN — CARVEDILOL 25 MG: 25 TABLET, FILM COATED ORAL at 08:51

## 2017-01-11 RX ADMIN — ARIPIPRAZOLE 5 MG: 5 TABLET ORAL at 08:51

## 2017-01-11 ASSESSMENT — ACTIVITIES OF DAILY LIVING (ADL)
LAUNDRY: UNABLE TO COMPLETE
ORAL_HYGIENE: INDEPENDENT
GROOMING: INDEPENDENT
DRESS: INDEPENDENT;STREET CLOTHES
ORAL_HYGIENE: INDEPENDENT
DRESS: INDEPENDENT;STREET CLOTHES
GROOMING: INDEPENDENT

## 2017-01-11 NOTE — PROGRESS NOTES
Pt has been pleasant and cooperative. Pt aware of discharge and feels ready to go back to AL. Pt denies SI/SIB and was able to state coping skills of music and talking with family. Pt reports that her /David is her primary support person.

## 2017-01-11 NOTE — DISCHARGE INSTRUCTIONS
Behavioral Discharge Planning and Instructions   Summary:   You were admitted to the [facility] on January 2, 2016 for depression. While on the unit, your symptoms stabilized. You deny any current suicidal or homicidal ideation. You are discharged today to [address] [place] [address], [address]  [address].  Phone number:  [phone].    Main Diagnosis:   1.  Major depressive disorder, recurrent, severe, without psychotic features.    2.  Anxiety, not otherwise specified (major depressive disorder with prominent anxiety symptoms).    3.  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      Major Treatments, Procedures and Findings:   Dr. Alvarez, your treating psychiatrist, adjusted your medications and managed your care throughout your stay. An internal medicine consult was completed during your stay. You had the opportunity to participate in treatment programming while on the unit including occupational therapy, mental health support and education and spiritual services.     Symptoms to Report:   Increased confusion, losing more sleep, mood getting worse, and/or thoughts of suicide.     Lifestyle Adjustment:   Stay in touch with your psychiatrist for any questions, and contact the doctor as soon as you experience a recurrence of depressive symptoms. Don't wait for the symptoms to progress to severe before getting help.    Follow-up Appointments:   Psychiatrist:     Yrn Echols - Tuesday, February 14th at 1:30 pm.  [facility]  [address] [place], [place] [address], [place] [address]  [phone]  Therapist:  Flakito Plata - Tuesday, January 17th at 2:00 pm.  [facility]  [address], [place] [address]  [phone]    Day Treatment:    [address] Outpatient Program, located in the [facility] the [facility].  Address:  [address] [place] [address].    Phone number: [phone] direct number for the program or [phone] for the Adult Outpatient Assessment/Intake phone number  Your Intake date is:  Thursday, January 19th at 9:00 am.    Resources:   [facility] Crisis Team (24 hour/7days a week): [phone].                   Crisis Intervention: 200.654.8385 or 863-713-9725 (TTY: 741.174.3301). Call anytime for help.   National [place] on Mental Illness (www.[place].jacob.org): 788.395.1808 or 219-528-8003.   Mental Health Consumer/Survivor Network of [place] (www.mhcsn.net): 809.712.6434 or 405-624-2148  Mental [place] Association [place] (www.mentalhealth.org): 573.178.9224 or 395-712-9464    General Medication Instructions:   See your medication sheet(s) for instructions.   Take all medicines as directed. Make no changes unless your doctor suggests them.   Go to all your doctor visits.   Be sure to have all your required lab tests. This way, your medicines can be refilled on time.   Do not use any drugs not prescribed by your doctor.   Avoid alcohol.    The treatment team has appreciated the opportunity to work with you.  We wish you the best in the future. If you have any questions or concerns our unit number is [phone].

## 2017-01-11 NOTE — DISCHARGE SUMMARY
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  The patient was moved to an assisted living facility with her  in August and is having a difficult time adjusting to the new transition.  She tells me that she does not like the place because she does not like the food and the meals served there.  She is also having difficulty making friends, \"just superficial kind of friendship.\"  She is also struggling with the holiday.  Her granddaughter has moved into their house with her family.  She tells me that she is having difficulty getting certain belongings out of her house into the new apartment because they do not have enough space.  She is also upset because the new apartment does not have a big kitchen for her to cook and is dependent on the meals served.  She tells me that her  has cognitive issues, also struggling with the transition.  She sees her therapist on a weekly basis but tells me that she was unable to do so over the holiday because her therapist took some time off.  She sees Dr. Yrn Echols for medication management, last appointment was 2 weeks ago.  She was continued on medication at the time.  She tells me that she has been fully compliant with medications and tolerating them well.  At one point she tells me that she believes the medications have been helpful.  She takes Ativan almost daily \"because it makes me feel better.\"  She tells me that she has been having " "memory problems, but she was fully oriented, engaged and recalled history adequately.  No overt confusion noted.  She spoke about fear of being abandoned by family, feeling lonely, and also struggling with \"getting old.\"  She was most recently hospitalized under the care of this provider in 02/2016.  She was referred to 55+ but unfortunately failed to adhere to the program.  She has been hospitalized or presented to the Emergency Department close to 6 times in the past 1 year for various medical complaints.  She broke her wrist sometime in September.        The patient tells me that she has been feeling depressed for about 2 months.  She has been sleeping well.  Her appetite has been intact.  She endorses poor energy, concentration and focus.  She also related that she lacks motivation.  She spends the majority of the day with her , \"we go shopping and do laundry.\"  She does not attend groups or other activities at the assisted living facility \"because they told me I can't, there are a lot of Hmong and black people there.\"  She is willing to attend the day program but concerned about lack of transportation.  She was unable to recall or identify symptoms related to angel or hypomania.  She denies history of hallucinations and paranoia.  She endorsed increased anxiety that seemed to be situational in nature.  She is unable to recall or identify history of panic attacks.  She is fearful of being abandoned and acknowledged low tolerability for stress and poor coping.  She reported no recent decline in cognitive functioning.  She continues to take her medications.  She exhibited adequate knowledge of her medical history and care received in the past.  She denies symptoms related to OCD, PTSD and eating disorder.        The patient sees Dr. Yrn Echols for medication management and Rochelle for therapy at [facility].  She had a history of ECTs several years ago.  She has had a few hospitalizations in the past, most " "recently under the care of this provider at [facility] [place] 02/07 through 02/15/2016.  At the time she was given the diagnosis of major depressive disorder, recurrent, without psychotic features; axiety, NOS; and dependent/borderline personality traits.  She has had numerous psychotropic trials including but not limited to Cymbalta, which she found helpful in the past, Prozac, Seroquel and Remeron.  She was discharged last time on a combination of Seroquel, Cymbalta and Remeron.  Since then she was taken off the Seroquel and started on Abilify.  She also takes p.r.n. Risperdal, \"my doctor told me I take it only if I thought about going to the Emergency Department,\" but tells me that she uses it seldomly.  She is prescribed Ativan 1 mg up to 4 times a day.  The patient tells me that she averages taking it once per day.  No prior suicidal attempts, self-harming behavior or physical aggression.  No prior CD treatment.        The patient denies tobacco, alcohol and illicit drug use.  No heavy or consistent caffeine consumption.  The patient has arthritis in her right knee.  She has history of coronary artery disease, status post 4 stent placement.  She denies history of head trauma, concussion and seizures.  She had left wrist fracture in May.  She also has a history of hyperlipidemia, hypertension, gastroesophageal reflux disease, chronic bilateral knee pain, and status post left wrist surgery in 05/2016 - 06/2016.  The patient's brother, mother and sister all suffer from depression and anxiety.  Per previous records, her sister benefited from Cymbalta.  The patient is allergic to amlodipine, lisinopril, Macrodantin, penicillin and walnut.  The patient [place].  She was raised by both parents.  She has 3 siblings.  She denies childhood abuse or neglect.  She graduated high school on time and had 4 years of college, majoring in English.  She worked as a library " specialist, retired 20 years ago.  She has 2 sons and 4 grandkids.  She has been  for 60+ years.  Her  is very supportive.  He has gait and cognitive problems.  They moved in August to assisted living facility versus senior housing.       See Admission note by Zoey Alvarez MD on 01/03/2017 for additional details.          DIagnoses:   1.  Major depressive disorder, recurrent, severe, without psychotic features.  No active symptoms at time of discharge.   2.  Anxiety, not otherwise specified (major depressive disorder with prominent anxiety symptoms).    3.  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        Labs:     Recent Results (from the past 336 hour(s))   Urine Culture Aerobic Bacterial    Collection Time: 01/03/17  1:15 PM   Result Value Ref Range    Specimen Description Midstream Urine     Special Requests Specimen received in preservative     Culture Micro       50,000 to 100,000 colonies/mL mixed urogenital mehreen Susceptibility testing not   routinely done      Micro Report Status FINAL 01/04/2017           Consults:   Consultation during this admission received from internal medicine.  No medical intervention was indicated.           Hospital Course:   Luna Hassan was admitted to Station 3B with attending Zoey Alvarez MD as a voluntary patient. The patient was placed under status 15 (15 minute checks) to ensure patient safety.   CBC, BMP and utox obtained.  Patient  did not require seclusion or administration of emergency medications to manage behavior.    All outpatient medications were continued with the exception of of the following subsequent changes:  1.  Abilify, increased from 4 to 5 mg daily.    2.  Cymbalta, increased from 60 to 90 mg daily.    3.  Remeron, continued at 15 mg at bedtime.    4.  PRN Ativan, lower from 1 mg q.6 h. to 0.5 mg q.6 h.    5.  PRN Risperdal was discontinued.    6.  PRN Zyprexa for agitation, Hydroxyzine for anxiety, and Trazodone for insomnia will  be offered.     Luna Hassan did participate in groups and was visible in the milieu. The patient tolerated medications well. The patient was often inconsistent with reported symptoms and affect was incongruent, but nonetheless reported mood symptoms resolved. The patient was active on the unit. She was social, engaged and attended groups. No overt angel, confusion or psychosis noted. She maintained denial of SI, HI and DESTINI. She slept well. Appetite was intact. She was compliant with medications and care.     On the day of discharge; the patient denied depression, anxiety, racing thoughts and irritability. Tolerating medications well. Future oriented, feeling hopeful, and denied SI, DESTINI and HI. No hallucinations or paranoia. Slept well and appetite intact.     Safety plan, post discharge recommendations and relapse prevention were discussed with the patient. The patient agreed to call 911 or present to ED if symptoms worsen or developed thoughts of suicide, self harm or homicide.  The patient agreed to continue medications and outpatient care.    Luna Hassan was released to home. At the time of discharge Luna Hassan was determined to not be a danger to herself or others. The patient agreed to referral to [facility] program.          Discharge Medications:     Current Discharge Medication List      START taking these medications    Details   traZODone (DESYREL) 50 MG tablet Take 1 tablet (50 mg) by mouth nightly as needed for sleep  Qty: 30 tablet, Refills: 0    Associated Diagnoses: Depression with anxiety         CONTINUE these medications which have CHANGED    Details   LORazepam (ATIVAN) 0.5 MG tablet Take 1 tablet (0.5 mg) by mouth every 6 hours as needed for anxiety  Qty: 30 tablet, Refills: 0    Associated Diagnoses: Depression with anxiety      DULoxetine (CYMBALTA) 30 MG EC capsule Take 3 capsules (90 mg) by mouth daily  Qty: 90 capsule, Refills: 0    Associated Diagnoses: Depression with  anxiety      mirtazapine (REMERON) 15 MG tablet Take 1 tablet (15 mg) by mouth At Bedtime  Qty: 30 tablet, Refills: 0    Associated Diagnoses: Depression with anxiety      ARIPiprazole (ABILIFY) 5 MG tablet Take 1 tablet (5 mg) by mouth daily  Qty: 30 tablet, Refills: 0    Associated Diagnoses: Depression with anxiety         CONTINUE these medications which have NOT CHANGED    Details   ascorbic acid (VITAMIN C) 500 MG CPCR CR capsule Take 500 mg by mouth daily      nystatin (MYCOSTATIN) cream Apply topically 3 times daily as needed for dry skin (Rashes under breast)      polyethylene glycol (MIRALAX/GLYCOLAX) powder Take 17 g by mouth daily      hydrOXYzine (ATARAX) 25 MG tablet Take 1-2 tablets (25-50 mg) by mouth every 4 hours as needed for itching or anxiety  Qty: 60 tablet, Refills: 0    Associated Diagnoses: Depression with anxiety; Itching      ASPIRIN PO Take 81 mg by mouth daily      Atorvastatin Calcium (LIPITOR PO) Take 40 mg by mouth At Bedtime       CARVEDILOL PO Take 12.5 mg by mouth 2 times daily (with meals)      VITAMIN D, CHOLECALCIFEROL, PO Take 5,000 Units by mouth daily      Clopidogrel Bisulfate (PLAVIX PO) Take 75 mg by mouth daily      Estrogens Conjugated (PREMARIN PO) Place 0.5-1 g vaginally three times a week M,W,F      diclofenac (VOLTAREN) 1 % GEL Place onto the skin 3 times daily as needed for moderate pain      HYDROCHLOROTHIAZIDE PO Take 25 mg by mouth daily      Losartan Potassium (COZAAR PO) Take 25 mg by mouth daily      Nitroglycerin (NITROSTAT SL) Place 0.4 mg under the tongue every 5 minutes as needed for chest pain      Pantoprazole Sodium (PROTONIX PO) Take 40 mg by mouth every morning (before breakfast)         STOP taking these medications       CIPROFLOXACIN PO Comments:   Reason for Stopping:         RisperiDONE (RISPERDAL PO) Comments:   Reason for Stopping:                    Psychiatric and Physical Examination:   Appearance:  appeared as age stated, well groomed,  awake, alert and no apparent distress  Attitude:  cooperative  Eye Contact:  good  Mood:  better  Affect:  appropriate and in normal range and bright and fully engaged.   Speech:  clear, coherent and normal prosody  Psychomotor Behavior:  no evidence of tardive dyskinesia, dystonia, or tics  Thought Process:  linear and goal oriented  Associations:  no loose associations  Thought Content:  no evidence of suicidal ideation or homicidal ideation and no evidence of psychotic thought  Insight:  fair  Judgment:  intact  Oriented to:  time, person, and place  Attention Span and Concentration:  intact  Recent and Remote Memory:  intact  Language and Fund of Knowledge: appropriate  Muscle Strength and Tone: normal  Gait and Station: using a walker but gait intact.          Discharge Plan:   Psychiatrist:   Yrn Page Tuesday, February 14th at 1:30 pm.   [facility]   [address] [place], [place] [id number]   [phone]  Therapist:   Flakito Page Tuesday, January 17th at 2:00 pm.   [facility]   [address] [place] [address]   [phone]  Day Treatment:   [address] Outpatient Program, located in the [facility].  Address: [address] [place] [address].   Phone number: [phone] direct number for the program or [phone] for the Adult Outpatient Assessment/Intake phone number  Your Intake date is: Thursday, January 19th at 9:00 am.  Resources:   [facility] Crisis Team (24 hour/7days a week): 468.278.1876.   Crisis Intervention: 391.611.9931 or 382-887-9518 (TTY: 925.278.2672). Call anytime for help.   National [place] on Mental Illness (www.mn.jacob.org): 485.364.9090 or 427-727-8893.   Mental Health Consumer/Survivor Network of [place] (www.mhcsn.net):  434-725-6178 or 975-218-7042   Mental [place] Association [place] (www.mentalhealth.org): 147-111-5010 or 586-215-8091       Attestation:  The patient has been seen and evaluated by me,  Zoey Alvarez MD

## 2017-01-11 NOTE — PROGRESS NOTES
"Discussed at length pt's discharge preparation.  Does have some concern that she will fall into the same behavior patterns when she returns home.  Developed action steps she can take to make positive changes.  Plans to follow-up on attending 55+.  Discussed her previous feelings that she didn't fit in with the 55+ group and that the group may be different at this time.  Also plans to contact office at [facility] about adding AL services so that she can participate in activities.      Pt identified keeping busy and avoiding negative self-talk as critical to depression recovery for her.  Discussed role of therapist as support and accountability.  Pt stated she has thought of therapist as \"a nice afternoon time to chat\" rather than a time to discuss goals and challenges.      Reviewed IMR paperwork.    Pt's meds from pharmacy on unit.  Pt cost for aripiprazole $265.00.  Recommend clear with pt before discharging with this medication from our pharmacy.    "

## 2017-01-11 NOTE — PROGRESS NOTES
Attended 2 of 2 OT groups. She worked on familiar task and discussed the benefit of being busy to help take her mind off how she is feeling and to take a break. She seemed reassured about d/c with not needing to feel 100% but knowing recovery is a process. She agreed and stated feeling it will be all right. Affect seemed more on the serious side though was quick to speak up on approach and initiated comments on occasion.  Seems aware of others and willing to be a part of group and participate.

## 2017-01-12 NOTE — PROGRESS NOTES
"Pt was discharged at 18:15 on 1/11/2017. Writer went over AVS and discussed pt's follow up care plan and medication administration list. Pt reports her mood, \"a little apprehensive and going home.\" Pt states she plans on attending all follow up appointments and starting the 55 plus program. Pt states she did this prior to her admission, \"It was okay.\" Pt denies any SI/SIB. Pt states her anxiety is present, \"just slightly.\" Pt denies any depressive thoughts/symptoms. Pt reports that she receives good support from her, \"family.\" She states her  takes her to her appointments outpatient.     While going over the AVS, writer noticed that the Coreg dose had stated 12.5 mg BID (Prior to admission dose). Writer could see in the MAR that the pt had been getting Coreg 25 mg BID. Per order history it stated Medicine increased pt's dose to Coreg 25 mg BID around the date of admission. Writer contacted NEGRITA Ziegler.A., per his phone he stated that she should continue on the Coreg 25 mg BID, as long as BP and HR were WNL. Lenny also stated to contact Psychiatry regarding the change in the order for the AVS. Writer called Dr. Alvarez, and he gave TORB that pt should continue on the dose she has been taking during hospitalization, Coreg 25 mg BID. He also gave TORB to provide pt with a paper prescription to take with her to her outpatient pharmacy to get the correct prescription. Pt knows to take (X2 12.5 mg of her Coreg BID) until she updates her prescription.    New AVS was printed and gone over with pt. No other questions or concerns at this time. Pt was discharged at 18:30, 1/11/2017.     "

## 2017-01-19 ENCOUNTER — HOSPITAL ENCOUNTER (OUTPATIENT)
Dept: BEHAVIORAL HEALTH | Facility: CLINIC | Age: 82
Discharge: HOME | End: 2017-01-19
Attending: PSYCHOLOGIST | Admitting: PSYCHOLOGIST
Payer: MEDICARE

## 2017-01-19 DIAGNOSIS — F32.9 MAJOR DEPRESSION: ICD-10-CM

## 2017-01-19 PROCEDURE — 90791 PSYCH DIAGNOSTIC EVALUATION: CPT | Performed by: SOCIAL WORKER

## 2017-01-19 ASSESSMENT — PAIN SCALES - GENERAL: PAINLEVEL: NO PAIN (0)

## 2017-01-19 NOTE — PROGRESS NOTES
"Standard Diagnostic Assessment     CLIENT'S NAME: Luna Hassan  MRN:   [id number]  :   1932 AGE:84 year old SEX: female  ACCT. NUMBER: [id number]  DATE OF SERVICE: 17 Start Time: 9:45AM End Time:  1145      Home Phone [phone]   Work Phone Not on file.   Mobile [phone]     Preferred Phone: see above  May we leave a program related message? Yes on cell phone but client does not check this often.    Yes, the patient has been informed that any other mental health professional providing mental health services to me will need access to this Diagnostic Assessment in order to develop a treatment plan and receive payment.     Identifying Information:  Luna Hassan is a 84 year old, White,  female. Luna attended the [facility]  with morrisand and son- they did not join DA    Reason for Referral: Luna was referred to 55+ Outpatient Program (55+) by treatment team on 3BW. Luna reports the reason for referral at this time is \" I am very anxious\"..    Luna verbalizes the following treatment/discharge goals: \"encouragement to deal with daily care, being more comfortable in my own skin\".    Current Stressors/Losses/Disappointments: Recently moved into [place]. Trouble with eating, poor appetite.     Per Client, Review of Symptoms:  Mood (Depression/Anxiety/Nicole/Anger): feeling sad,  worthless, low self esteem, tremors, fear of public transport, hypomania.    Thoughts: worried, distracted, confused.  Concentration/Memory: poor short term memory.  Appetite/Weight: (see also, Physical Health Screening below) decreased, loss of appetite.   Sleep: NA    Motivation/Energy: agitated, restless, low motivation.  Behavior: Poor listening, less talkative.     Psychosis: NA     Trauma: NA   Other: Stressors: Housing, aging.    Mental Health History:  Luna reports first onset of mental health symptoms About age 70.  Luna was first diagnosed About age 70, depression and anxiety. "   Luna received the following mental health services in the past: counseling, day treatment, inpatient mental health services, physician / PCP and psychiatry.   Psychiatric Hospitalizations: [facility] 1.3.17- 1.11.17; [facility], 12/2016;  [facility] 2006.   Luna denies a history of civil commitment.     Onset/Duration/Pattern of Symptoms noted above: Last summer and last fall had to stop caring for great grandchild due physical health limitations and this was a loss, began to feel more depressed, and anxious.     Luna reports the following understanding of her diagnosis: Major Depression and Anxiety.      Personal Safety:    Are you depressed or being treated for depression? yes   Have you ever thought about hurting yourself (SIB) now or in the past? no     Have you ever thought about suicide now or in the past? What were your thoughts about suicide? Thought about using pills to overdose. Didn't really want to do it.  Are you having thoughts of suicide now? No  Do / Did you have a plan? No just passive thoughts.  Do you have a gun or other weapon available to you? No     Do you have a gun, weapons or other means (including medications) to harm yourself available to you? No   Have any of your family members or friends attempted or completed suicide? (If yes, Who, When, How) no     Do you take chances with your safety?   no   Have you currently or in the past had trouble with physical aggression (If yes, describe)? Yes- When in [place] was very afraid that she was going to be harmed so hit the wall.     Have you ever thought about killing someone else? No   Have you ever heard voices? No       Supports:   From whom do you receive support? (family/friends/agency)  and 2 sons; niece Karmen; therapist.     How often do you have contact with them? Daily with ; quite frequently; seeing therapist weekly.     Do your support people want/need education/resources? no        Is  there anything in your life (current or history) that is satisfying to you (include leisure interests/hobbies)?   Yes- used to like to read and love to garden.       Hope/Belief System:  Do you think things can get better? yes     Rate how strongly you believe things can get better:   (Scale 1-5; 1=no belief; 5=Very Strong Belief)    3    What would make it better?  Getting back to reading    What gives you hope?    My family.       Personal Safety Summary:  After gathering the above information, Luna  presents the following high risk factors for suicide: Panic,extreme anxiety Mood disorder with psychosis/paranoia Hopelessness, Worthlessness.  Luna denies current fears or concerns for personal safety.    Luna has the following Protective Factors: Sense of responsibility to family, Reality testing ability, Positive social support and Positive therapeutic releationships      Upon review of the patient interview and identification of high risk factors determine individualized safety strategies alternatives and treatment plan interventions. Client consented to co-developed safety plan, which includes 1. Tell family and talk to family. 2. Talk to therapist. 3. Read the paper. and listen to TV. .     Substance Use History:       Substance: Hx of Use/Abuse: Last Use: Pattern of Use:   Alcohol no NA NA   Cannabis no NA NA   Street Drugs no NA NA   Prescription Drugs no NA NA   Other no NA NA     Substance Use Disorder Treatment: Luna is currently receiving the following services: No indications of CD issues.       CAGE-AID:  Have you ever felt you ought to cut down on your drinking or drug use?   No    Have people annoyed you by criticizing your drinking or drug use?   No    Have you ever felt bad or guilty about your drinking or drug use?   No    Have you ever had a drink or used drugs first thing in the morning to steady your nerves or to get rid of a hangover?  No    Do you feel these issues have been  "adequately addressed?   Yes. How? NO CD issues.    Chemical Dependency Assessment Recommended?  No        Luna has a negative Cage-Aid score.     Legal History:    Luna reports that she has not been involved with the legal system.   ________________________________________________________________________    Life Situation (Employment/School/Finances/Basic Needs):  Luna  is currently living with . in a Independent senior residence.   The safety/stability of this environment is described as: safe    Luna is currently retired:   Luna describes a work Hx of Worked for 20 years as a .    Luna reports finances are obtained through Employment and SSI  Luna does not identify her finances as a current stressor.  Luna denies a history of gambling and denies a history of gambling treatment.     Luna reports her highest level of education is college graduate [employer]. Luna did not identify any learning problems   Luna describes academic performance as: B average   Luna describes school social experience as: \" loved it\".     Luna denies concerns regarding her current ability to meet basic needs.     Social/Family History:  Luna  reports she grew up in [place].   Luna was the fourth born of 4 children.   Luna reports her biological parents are     Luna describes her childhood as \" ideal in lots of ways, we were poor but had a beautiful farm.  Luna describes her current relationships with her family of origin as: living sister is a Nun, living in [place] but is ill and not available.     Luna identifies her relationship status as: . 60 years.   Luna identifies her sexual orientation as: opposite sex   Luna denies sexual health concerns.     Luna reports having 2 children.  2 adult children- boys.    Luna describes the quantity/quality of her social " "relationships as \" Up until last round of depression it was great\".         Significant Losses / Trauma / Abuse / Neglect Issues / Developmental Incidents:  Luna denies significant loss/trauma/abuse/neglect issues/developmental incidents   Luna reports loss- recent move to senior living, broke wrist, [facility] faiiling.   Luna has addressed the above concerns in previous therapy/treatment currently in therapy.    Luna denies personal  experience.     Yazidism Preference/Spiritual Beliefs/Cultural Considerations:    A. Ethnic Self-Identification:  Luna self-identifies her race as:  and her preferred language to be English.   Luna reports she does not need the assistance of an . Luna  reports she does not need other support or modifications involved in therapy.      B. Do you experience cultural bias (the practice of interpreting judging behavior by standards inherent to one's own culture) by other people as a stressor? If yes, describe how this relates to overall mental health symptoms.  No    C. Are there any cultural influences that may need to be considered for your treatment?  (This includes historical, geographical and familial factors that affect assessment and intervention processes). No, Denies any cultural influences or concerns that need to be considered for treatment    Strengths/Vulnerabilities:   Luna identifies her personal strengths as: caring, has a previous history of therapy, open to suggestions / feedback, responsible parent, support of family, friends and providers, wants to learn, willing to ask questions, willing to relate to others and work history .   Things that may interfere with the clients success in treatment include: transportation concerns.   Other identified areas of vulnerability include: Anxiety /without panic attacks  Depressive symptoms.     Medical History / Physical Health Screen:     Primary Care " Physician: Luna has a non-[place] Primary Care Provider. Their PCP is dr Jacques...   Last Physical Exam: within the past year. Client was encouraged to follow up with PCP if symptoms were to develop.    Mental Health Medication Management Provider / Psychiatrist: Luna has a psychiatrist whose name and location are: Dr Yrn Mares..     Last visit: A few weeks ago.        Next visit: Feb., 2017.    Current medications including prescription, non-prescription, herbals, dietary aids and vitamins:  Per client report:   Outpatient Prescriptions Marked as Taking for the 1/19/17 encounter (Hospital Encounter) with Isabelle Best LICSW   Medication Sig     carvedilol (COREG) 25 MG tablet Take 1 tablet (25 mg) by mouth 2 times daily (with meals)     LORazepam (ATIVAN) 0.5 MG tablet Take 1 tablet (0.5 mg) by mouth every 6 hours as needed for anxiety     DULoxetine (CYMBALTA) 30 MG EC capsule Take 3 capsules (90 mg) by mouth daily     mirtazapine (REMERON) 15 MG tablet Take 1 tablet (15 mg) by mouth At Bedtime     traZODone (DESYREL) 50 MG tablet Take 1 tablet (50 mg) by mouth nightly as needed for sleep     ARIPiprazole (ABILIFY) 5 MG tablet Take 1 tablet (5 mg) by mouth daily     nystatin (MYCOSTATIN) cream Apply topically 3 times daily as needed for dry skin (Rashes under breast)     polyethylene glycol (MIRALAX/GLYCOLAX) powder Take 17 g by mouth daily     ASPIRIN PO Take 81 mg by mouth daily     Atorvastatin Calcium (LIPITOR PO) Take 40 mg by mouth At Bedtime      VITAMIN D, CHOLECALCIFEROL, PO Take 5,000 Units by mouth daily     Clopidogrel Bisulfate (PLAVIX PO) Take 75 mg by mouth daily     Estrogens Conjugated (PREMARIN PO) Place 0.5-1 g vaginally three times a week M,W,F     diclofenac (VOLTAREN) 1 % GEL Place onto the skin 3 times daily as needed for moderate pain     HYDROCHLOROTHIAZIDE PO Take 25 mg by mouth daily     Losartan Potassium (COZAAR PO) Take 25 mg by mouth daily     Nitroglycerin  (NITROSTAT SL) Place 0.4 mg under the tongue every 5 minutes as needed for chest pain     Pantoprazole Sodium (PROTONIX PO) Take 40 mg by mouth every morning (before breakfast)       Luna reports current medications are: Effective.   Luna describes taking her medications as: Independent.  Luna reports taking prescribed medications as prescribed.     Luna provides the following current assessment of pain:  None.     Luna provides the following information regarding past significant medical conditions/diagnoses:      Medical:  Past Medical History   Diagnosis Date     Arthritis      right knee-     Coronary artery disease 2005     Depressive disorder      Hypertension        Surgical:  Past Surgical History   Procedure Laterality Date     Cardiac surgery  2006, 2009     4 stents     Colonoscopy       OK     Allergy:   Luna reports   Allergies   Allergen Reactions     Amlodipine Swelling     leg     Lisinopril Other (See Comments)     5 mg caused hypokalemia     Macrodantin [Nitrofurantoin] Hives     Penicillins Hives     Walnuts [Nuts] Swelling     Hard to breathe        Family History of Medical, Mental Health and/or Substance Use problems:  Per client report:   Family History   Problem Relation Age of Onset     Depression Mother      Anxiety Disorder Mother      Unknown/Adopted Paternal Grandmother      Unknown/Adopted Paternal Grandfather      Depression Sister      Anxiety Disorder Son      Depression Brother      Anxiety Disorder Brother      Bipolar Disorder Brother        Luna reports no current medical concerns.      General Health:   Have you had any exposure to any communicable disease in the past 2-3 weeks? no     Are you aware of safe sex practices? yes     Is there a possibility of pregnancy?  no       Nutrition:    Are you on a special diet? If yes, please explain:  no   Do you have any concerns regarding your nutritional status? If yes, please explain:  no   Have you  had any appetite changes in the last 3 months?  No     Have you had any weight loss or weight gain in the last 3 months?  No- lost 10 lbs in last year due to poor appetite.     Do you have a history of an eating disorder? no   Do you have a history of being in an eating disorder program? no   NOTE: BMI to be calculated following program admission.    Fall Risk:   Have you had any falls in the past 3 months? no     Do you currently useany assistive devices for mobility?   Yes- uses walker.     NOTE: If client reports 3 or more falls in the past 3 months, the client will not be accepted into the program until further assessment is completed by the program nurse. Check if a nurse is available to assess at time of DA.    NOTE: If client reports 2 falls in the past 3 months and/or the client currently uses assistive devices for mobility, the  will send an in-basket to the program nurse to meet with the client within the first week of programming.    Head Injury/Trauma:   Do you have a history of head injury / trauma? no     Do you have any cognitive impairment? no       Per completion of the Medical History / Physical Health Screen, is there a recommendation to see / follow up with a primary care physician/clinic?    No.      Clinical Findings     Mental Status Assessment/Clinical Observation:  Appearance:   awake, alert and neatly groomed  Eye Contact:   good  Psychomotor Behavior: Retarded (Slowed)  intact station, gait and muscle tone  Attitude:   Cooperative    Oriented to:   All    Speech   Rate / Production: Normal    Volume:  Normal   Mood:    Anxious  Depressed     Affect:    Constricted      Thought Content:  Clear  no evidence of suicidal ideation or homicidal ideation, no evidence of psychotic thought, no auditory hallucinations present and no visual hallucinations present  Thought Form:  logical and linear no loose associations  Insight:    fair    Judgment:     fair  Attention  Span/Concentration: fair  Recent and Remote Memory:  fair      Psychiatric Diagnosis:    296.33 Major Depressive Disorder, Recurrent Episode, Severe _ and With anxious distress    Provisional Diagnostic Hypothesis (Explain R/O, other Provisional Diagnosis, and why alternative Diagnosis that were considered were ruled out):       Medical Concerns that may Impact Treatment:   Stable    Psychosocial and Contextual Factors (V-Codes):  V62.89 Phase of life problem  health failing, client health issues, recent move to senior housing and leaving home, isolation    WHODAS 2.0 SCORE: 41/97 %    Client and family participation in assessment:   Luna was alone during this assessment. Son and  in waiting room.  This assessment does include collateral information. Reviewed Epic notes from recent hospitalization.     Summary & Recommendations  Provide a brief summary of how diagnostic criteria is met (symptoms, duration & functional impairment), cause, prognosis, and likely consequences of symptoms. Include overview of pertinent client strengths, cultural influences, life situations, relationships, health concerns and how diagnosis interacts/impacts with client's life. Recommendations include: client preferences, prioritization of needed mental health, ancillary or other services and any referrals to services required by statute or rule.     Luna Hassan is an 84 year old woman referred to day treatment by the treatment team on 3BW after being hospitalized for worsening depression and anxiety. She endorses, low energy, low motivation, difficulty with ADL's, anxiety, hypomania, worried, distracted and confusion. Currently she denies suicidal ideation. She recently moved into senior housing and reports this has been significant stressor. She also report poor appetite with 10 lb weight loss over past year. She endorsed concerns about her 's health and her own aging. She has lost pleasure in reading and  "feels she lost gardening due to move out of home with yard. Her goals identified were:encouragement to deal with daily care, being more comfortable in my own skin\".  Recommend 55+ in order to provide structured support, reinforce coping skills and learn symptom management and relapse prevention.        Prognosis is Guarded. Without the recommended intervention, the client is likely to experience the following consequences of their symptoms: Further deterioration with possibility of another hospitalization.    Referrals to services required by statute or rule:   Report to child/adult protection services was NA.   Referral to another professional/service is not indicated at this time..    Program Recommendation: 55+ Outpatient Program (55+).  B1    Assessment Completed by: DAISHA Espinal.    "

## 2017-02-01 ENCOUNTER — HOSPITAL ENCOUNTER (OUTPATIENT)
Dept: BEHAVIORAL HEALTH | Facility: CLINIC | Age: 82
End: 2017-02-01
Attending: PSYCHIATRY & NEUROLOGY
Payer: MEDICARE

## 2017-02-01 VITALS — HEIGHT: 63 IN | BODY MASS INDEX: 22.43 KG/M2 | WEIGHT: 126.6 LBS

## 2017-02-01 PROCEDURE — H2012 BEHAV HLTH DAY TREAT, PER HR: HCPCS

## 2017-02-01 ASSESSMENT — ANXIETY QUESTIONNAIRES
GAD7 TOTAL SCORE: 11
7. FEELING AFRAID AS IF SOMETHING AWFUL MIGHT HAPPEN: MORE THAN HALF THE DAYS
3. WORRYING TOO MUCH ABOUT DIFFERENT THINGS: MORE THAN HALF THE DAYS
2. NOT BEING ABLE TO STOP OR CONTROL WORRYING: MORE THAN HALF THE DAYS
6. BECOMING EASILY ANNOYED OR IRRITABLE: SEVERAL DAYS
1. FEELING NERVOUS, ANXIOUS, OR ON EDGE: SEVERAL DAYS
5. BEING SO RESTLESS THAT IT IS HARD TO SIT STILL: MORE THAN HALF THE DAYS
IF YOU CHECKED OFF ANY PROBLEMS ON THIS QUESTIONNAIRE, HOW DIFFICULT HAVE THESE PROBLEMS MADE IT FOR YOU TO DO YOUR WORK, TAKE CARE OF THINGS AT HOME, OR GET ALONG WITH OTHER PEOPLE: SOMEWHAT DIFFICULT

## 2017-02-01 ASSESSMENT — PATIENT HEALTH QUESTIONNAIRE - PHQ9: 5. POOR APPETITE OR OVEREATING: SEVERAL DAYS

## 2017-02-01 NOTE — PROGRESS NOTES
Group Therapy Progress Notes     Area of Treatment Focus:  Symptom Management and Abstinence/Relapse Prevention    Therapeutic Interventions/Treatment Strategies:  Support, Redirection, Feedback, Limit/Boundaries, Safety Assessments, Structured Activity, Clarification, Education, Motivational Enhancement Therapy and Cognitive Behavioral Therapy, introduced to group members and how mental health management education group functions.    Response to Treatment Strategies:  Accepted Feedback, Gave Feedback, Listened, Focused on Goals, Attentive, Accepted Support, Alert and Demonstrates Behavior Change    Name of Group:  Mental Health Management    Progress Note  Using provided handout, reading aloud, discussed three levels of depression, identified client's particular relapse warning signs, and started to read and discuss strategies to cope with these relapse warning signs.          Is this a Weekly Review of the Progress on the Treatment Plan?  Yes.      Are Treatment Plan Goals being addressed?  Yes, continue treatment goals      Are Treatment Plan Strategies to Address Goals Effective?  Yes, continue treatment strategies      Are there any current contracts in place?  No

## 2017-02-01 NOTE — PROGRESS NOTES
Group Therapy Progress Notes     Area of Treatment Focus:  Symptom Management    Therapeutic Interventions/Treatment Strategies:  Support, Feedback, Safety Assessments, Structured Activity and Education    Response to Treatment Strategies:  Accepted Feedback, Listened, Focused on Goals, Attentive and Alert    Name of Group:  Wellness    Progress Note  Client presented with calm,even mood during first session in the Day Treatment Program.Good focus and concentration during a discussion related to identifying life purpose. Client acknowledged troubles with depression. She was quiet and did not initiate any social interaction.      Is this a Weekly Review of the Progress on the Treatment Plan?  No

## 2017-02-01 NOTE — PROGRESS NOTES
Group Therapy Progress Notes      Area of Treatment Focus:  Symptom Management, Personal Safety, Community Resources/Discharge Planning, Abstinence/Relapse Prevention, Develop / Improve Independent Living Skills and Develop Socialization / Interpersonal Relationship Skills    Therapeutic Interventions/Treatment Strategies:  Support, Feedback, Structured Activity, Problem Solving, Clarification, Education and Motivational Enhancement Therapy    Response to Treatment Strategies:  Accepted Feedback, Gave Feedback, Listened, Focused on Goals, Attentive, Alert and Demonstrates Behavior Change    Name of Group:  Psychotherapy Group      Progress Note  Hour 1:  Luna reports she has been depressed for past 13 months since she had knee pain and had to quit caring for her grandchild. She became so depressed she was expressing suicidal ideation and her psychiatrist advised hospitalization. She remains flat and blunted in affect, paucity of language. She reports she is grieving moving from her home of 55+ years to a senior living setting. She has not met any of the residents yet. She did attend a book group but has been struggling with her depression. She reported she did not sleep last night and today feels not well. She received a lot of supportive feedback and she stated she wanted to continue to come to address her mental health.    Hour 2: Discussed all the emotional resilience required to move and start over in later life. Lorelei is grieving moving from her home. She does realize that her new environment may provide opportunities and that she and her  are free from all the home maintenance, but she remains sad about the move.      Is this a Weekly Review of the Progress on the Treatment Plan?  Yes.      Are Treatment Plan Goals being addressed?  Yes, continue treatment goals      Are Treatment Plan Strategies to Address Goals Effective?  Yes, continue treatment strategies      Are there any current contracts  in place?  Yes. safety No suicidal ideation endorsed.

## 2017-02-01 NOTE — PROGRESS NOTES
RN Review of Medical History / Physical Health Screen  Outpatient Behavioral Programs      CLIENT'S NAME: Luna Hassan  MRN:   [id number]  :   1932 AGE:84 year old SEX: female    DATE OF DIAGNOSTIC ASSESSMENT: 17  DATE OF ADMISSION: 17   PROGRAM: 55+ Outpatient Program (55+)     Following admission, the RN reviewed the following:    - Medical History / Physical Health Screen completed during the DA noted above.  - Immediate Health Concerns as noted on the Initial Individual Treatment Plan.    Client height and weight recorded by RN in epic: yes    BMI Review:  Was the patient informed of BMI? yes      Findings Normal, No Intervention       RN Recommendations include: Advised to follow up with primary care physician for any recommendations.  General nutrition education will be provided in group setting.    ERASTO PATEL  2017

## 2017-02-02 ASSESSMENT — ANXIETY QUESTIONNAIRES: GAD7 TOTAL SCORE: 11

## 2017-02-02 ASSESSMENT — PATIENT HEALTH QUESTIONNAIRE - PHQ9: SUM OF ALL RESPONSES TO PHQ QUESTIONS 1-9: 11

## 2017-02-06 ENCOUNTER — HOSPITAL ENCOUNTER (OUTPATIENT)
Dept: BEHAVIORAL HEALTH | Facility: CLINIC | Age: 82
End: 2017-02-06
Attending: PSYCHIATRY & NEUROLOGY
Payer: MEDICARE

## 2017-02-06 PROCEDURE — H2012 BEHAV HLTH DAY TREAT, PER HR: HCPCS

## 2017-02-06 NOTE — PROGRESS NOTES
Group Therapy Progress Notes      Area of Treatment Focus:  Symptom Management, Personal Safety, Community Resources/Discharge Planning, Abstinence/Relapse Prevention, Develop / Improve Independent Living Skills and Physical Health     Therapeutic Interventions/Treatment Strategies:  Support, Redirection, Feedback, Safety Assessments, Structured Activity, Problem Solving, Clarification and Education    Response to Treatment Strategies:  Accepted Feedback, Gave Feedback, Listened, Focused on Goals, Attentive, Accepted Support and Alert    Name of Group:  Wellness    Progress Note  Luna participated in mind/body class. Provided education about various breathing techniques that can illicit the relaxation response as well as gentle movement while seated in a chair.           Is this a Weekly Review of the Progress on the Treatment Plan?  No

## 2017-02-06 NOTE — PROGRESS NOTES
Group Therapy Progress Notes      Area of Treatment Focus:  Symptom Management, Community Resources/Discharge Planning, Abstinence/Relapse Prevention, Develop / Improve Independent Living Skills and Develop Socialization / Interpersonal Relationship Skills    Therapeutic Interventions/Treatment Strategies:  Support, Feedback, Limit/Boundaries, Structured Activity, Problem Solving, Clarification, Education and Motivational Enhancement Therapy    Response to Treatment Strategies:  Accepted Feedback, Gave Feedback, Listened, Focused on Goals, Attentive, Accepted Support, Alert and Demonstrates Behavior Change    Name of Group:  Psychotherapy Group    Progress Note  Hour 1:Lorelei looked very flat, poor eye contact. She reported she is not doing well and feels she has too many details to manage and is very worried about her  driving here to get her yet she feels she is too depressed to deal with metro mobility. Writer helped her problem solve-will call MM on Wed and get her on the schedule. This seemed to perk her up some. She also reported she was physically exhausted from very minimal gentle move,ment this morning and her appetite is poor. Encouraged eat as she can and start to move more often using her cane.    Hour 2: Facilitated general discussion about developing structure during day to assist with focus and also setting priorities when everything seems to be urgent. Lorelei too preoccupied and depressed to participate.      Is this a Weekly Review of the Progress on the Treatment Plan?  No

## 2017-02-08 ENCOUNTER — HOSPITAL ENCOUNTER (OUTPATIENT)
Dept: BEHAVIORAL HEALTH | Facility: CLINIC | Age: 82
End: 2017-02-08
Attending: PSYCHIATRY & NEUROLOGY
Payer: MEDICARE

## 2017-02-08 PROCEDURE — H2012 BEHAV HLTH DAY TREAT, PER HR: HCPCS

## 2017-02-08 NOTE — PROGRESS NOTES
Group Therapy Progress Notes      Area of Treatment Focus:  Symptom Management, Community Resources/Discharge Planning, Abstinence/Relapse Prevention, Develop / Improve Independent Living Skills and Develop Socialization / Interpersonal Relationship Skills    Therapeutic Interventions/Treatment Strategies:  Support, Feedback, Safety Assessments, Structured Activity, Problem Solving, Clarification, Education and Motivational Enhancement Therapy    Response to Treatment Strategies:  Accepted Feedback, Gave Feedback, Listened, Attentive and Alert    Name of Group:  Psychotherapy Group      Progress Note  Hour 1: Lorelei still reports and looks very depressed. She shared she feels ashamed to admit she is too depressed to shower, is struggling to eat, feels very fatigued and does not have the concentration to schedule metro mobility ride. Writer agreed to help her over lunch. She did show more humor and was able to make better eye contact with peers and add a few bits of feedback. Writer helped her buy a metro mobility GO CARD for 4 rides here on line, also provided her with written instructions about how to schedule her ride.    Hour 2: Theme of hour 2 included - old wounds-what to do when sad feelings return; also mind body with some experiential guided meditation to provide relaxation skill, as well as coping skill.           Is this a Weekly Review of the Progress on the Treatment Plan?  Yes.      Are Treatment Plan Goals being addressed?  Yes, continue treatment goals      Are Treatment Plan Strategies to Address Goals Effective?  Yes, continue treatment strategies      Are there any current contracts in place?  Yes. safety No SI endorsed.

## 2017-02-08 NOTE — PROGRESS NOTES
"  Adult Mental Health Outpatient Group Therapy Progress Note     Client Initial Individualized Goals for Treatment: \" to get comfortable in my skin, start to have more energy to care for self\".    See Initial Treatment suggestions for the client during the time between Diagnostic Assessment and completion of the Master Individualized Treatment Plan:    Treatment Goals:     see above.     Area of Treatment Focus:  Symptom Management, Develop / Improve Independent Living Skills and Develop Socialization / Interpersonal Relationship Skills    Therapeutic Interventions/Treatment Strategies:  Support, Feedback, Safety Assessments, Structured Activity and Problem Solving    Response to Treatment Strategies:  Accepted Feedback, Gave Feedback, Listened, Focused on Goals, Attentive and Accepted Support    Name of Group:  Wellness     Description and Outcome:  Client attended and participated in a structured life skills group session where intervention focused on mindfulness education regarding mindfulness through activity, meditation, and the neuroscience supporting the effectiveness that mindfulness mindfulness has on one's well-being. Client presented to group with a restricted affect. Participated in turn in structured group activity. Fair task focus throughout session. Client would benefit from additional training on this topic. Client addressed ITP goal number initial goal in this session.    Lisa Taylor OTR/L    Is this a Weekly Review of the Progress on the Treatment Plan?  No          "

## 2017-02-09 NOTE — PROGRESS NOTES
"  Adult Mental Health Outpatient Group Therapy Progress Note     Client Initial Individualized Goals for Treatment: \" to get comfortable in my skin, start to have more energy to care for self\".    See Initial Treatment suggestions for the client during the time between Diagnostic Assessment and completion of the Master Individualized Treatment Plan:    Treatment Goals:     see above.     Area of Treatment Focus:  Symptom Management, Develop / Improve Independent Living Skills and Develop Socialization / Interpersonal Relationship Skills    Therapeutic Interventions/Treatment Strategies:  Support, Feedback, Safety Assessments, Structured Activity and Problem Solving    Response to Treatment Strategies:  Accepted Feedback, Gave Feedback, Listened, Focused on Goals, Attentive and Accepted Support    Name of Group:  Wellness     Description and Outcome:  Client attended and participated in a structured life skills group session where intervention focused on identifying the components of occupational life balance, assessing one's life balance, and developing strategies to increase life balance. Client endorsed depressed mood. Energy level low. Poor concentration during structured group activity. She participated minimally, but offered her thoughts and feedback when prompted. Client would benefit from further teaching on this topic. Client addressed ITP goal number initial goal in this session.     Lisa Taylor OTR/L    Is this a Weekly Review of the Progress on the Treatment Plan?  No          "

## 2017-02-13 ENCOUNTER — HOSPITAL ENCOUNTER (OUTPATIENT)
Dept: BEHAVIORAL HEALTH | Facility: CLINIC | Age: 82
End: 2017-02-13
Attending: PSYCHIATRY & NEUROLOGY
Payer: MEDICARE

## 2017-02-13 PROCEDURE — H2012 BEHAV HLTH DAY TREAT, PER HR: HCPCS

## 2017-02-13 NOTE — PROGRESS NOTES
Group Therapy Progress Notes       Area of Treatment Focus:  Symptom Management and Personal Safety      Therapeutic Interventions/Treatment Strategies:  Support, Feedback, Safety Assessments, Structured Activity, Clarification and Motivational Enhancement Therapy      Response to Treatment Strategies:  Accepted Feedback, Gave Feedback, Listened, Focused on Goals, Attentive, Accepted Support, Alert and Demonstrates Behavior Change      Name of Group: Group Psychotherapy      Progress Note  Session One: Luna reported that today was her first time taking [employer] and it was a good experience, however she was anxious about it and couldn't sleep past 2:00 AM. She also said that she is having a hard time feeling like she is one of this group as she is much older than others, has never learned how to drive, has lived in the same home for 50 years. Her  is tired of driving on the freeway. She wonders if she needs to try a different med from [place] because she has been waking up early for about 3-4 nights. She is going to see her psychiatrist tomorrow and while she talked about what he needed to know from her, I wrote it down for her so she can make sure he gets the complete picture of her concerns. She demonstrated slowed cognition. We talked about stretching to see if that might help when she wakes up early, and she wasn't sure what that meant, so we stretched as a group and she seemed to benefit from this concrete assistance/practice. She reports that her  has had a problem of hydrocephaly and has been taking Vitamin B12 which has helped him a lot. Her is concerned that she is unable to eat enough to maintain her body weight, is unable to feel secure enough to take a bath or shower, so just washes at the sink. And she wonders if she should be in assisted living facility as a result. She will discuss this with ehr doctor tomorrow.      Session Two: talked about the importance of and how to do  simple stretches, and proceeded to do them. Everyone felt better following this exercise- more alert and pink-cheeked.          Is this a Weekly Review of the Progress on the Treatment Plan?  Yes.       Are Treatment Plan Goals being addressed?  Yes, continue treatment goals          Are Treatment Plan Strategies to Address Goals Effective?  Yes, continue treatment strategies          Are there any current contracts in place?  No

## 2017-02-13 NOTE — PROGRESS NOTES
"  Adult Mental Health Outpatient Group Therapy Progress Note     Client Initial Individualized Goals for Treatment: \" to get comfortable in my skin, start to have more energy to care for self\".    See Initial Treatment suggestions for the client during the time between Diagnostic Assessment and completion of the Master Individualized Treatment Plan:    Treatment Goals:     see above.     Area of Treatment Focus:  Symptom Management, Develop / Improve Independent Living Skills and Develop Socialization / Interpersonal Relationship Skills    Therapeutic Interventions/Treatment Strategies:  Support, Feedback, Safety Assessments, Structured Activity and Problem Solving    Response to Treatment Strategies:  Accepted Feedback, Gave Feedback, Listened, Focused on Goals, Attentive and Accepted Support    Name of Group:  Wellness     Description and Outcome:  Pt attended and participated in a structured life skills group session where intervention focused on values identification and expression in one's daily occupations. Client had a constricted affect in session. She participated in structured group activity in turn. Verbalized understanding of session content by identifying important values to her personally. She offered little information aside from direct responses to questions. Client would benefit from additional teaching on this topic. Client addressed ITP goal number initial goal in this session.     Lisa Taylor OTR/L    Is this a Weekly Review of the Progress on the Treatment Plan?  No          "

## 2017-02-15 ENCOUNTER — HOSPITAL ENCOUNTER (OUTPATIENT)
Dept: BEHAVIORAL HEALTH | Facility: CLINIC | Age: 82
End: 2017-02-15
Attending: PSYCHIATRY & NEUROLOGY
Payer: MEDICARE

## 2017-02-15 PROCEDURE — H2012 BEHAV HLTH DAY TREAT, PER HR: HCPCS

## 2017-02-15 NOTE — PROGRESS NOTES
Group Therapy Progress Notes     Area of Treatment Focus:  Symptom Management, Personal Safety, Develop / Improve Independent Living Skills and Develop Socialization / Interpersonal Relationship Skills    Therapeutic Interventions/Treatment Strategies:  Support, Feedback, Safety Assessments, Structured Activity, Problem Solving and Education    Response to Treatment Strategies:  Accepted Feedback, Gave Feedback, Listened, Focused on Goals, Attentive, Accepted Support and Alert    Name of Group:   Coping with Depression    Progress Note  Client was quiet in group today. She did participate in structured activity (Assertiveness Worksheet). She provided positive feedback to other group members and accepted positive feedback from peers.  Client reports that she does not have any issues being assertive when she needs to be.  She states she did struggle with this when she was younger.      Is this a Weekly Review of the Progress on the Treatment Plan?  No

## 2017-02-15 NOTE — PROGRESS NOTES
Group Therapy Progress Notes     Area of Treatment Focus:  Symptom Management and Personal Safety    Therapeutic Interventions/Treatment Strategies:  Support, Redirection, Feedback, Safety Assessments, Structured Activity, Clarification and Motivational Enhancement Therapy    Response to Treatment Strategies:  Accepted Feedback, Gave Feedback, Listened, Focused on Goals, Attentive, Accepted Support, Alert and Demonstrates Behavior Change    Name of Group:  Group Psychotherapy    Progress Note  Session One: Luna saw her RN and psychiatrist yesterday. She says everyone is telling her to do the same things but she struggles to remember what they are, then later was able to remember- eat more, talk more, don't isolate, use her cane for balance, eat small sized bites to aid her swallowing and drink more water. She got some ensure. She said that she feels lost until she has her breakfast and was tearful as she said this. She reports she can't have ensure in the morning because it is too sweet. She feels that she can think best in the evening.     Session Two: Each group member decided on a step they were willing to take within the next week to improve their life. Luna identified that she will start taking her ensure before going to bed because her supper is at 4:30 and she might be able to consume something at that time and it might help her feel better in the morning.      Is this a Weekly Review of the Progress on the Treatment Plan?  Yes.      Are Treatment Plan Goals being addressed?  Yes, continue treatment goals      Are Treatment Plan Strategies to Address Goals Effective?  Yes, continue treatment strategies      Are there any current contracts in place?  No

## 2017-02-16 NOTE — PROGRESS NOTES
Group Therapy Progress Notes     Area of Treatment Focus:  Symptom Management    Therapeutic Interventions/Treatment Strategies:  Support, Feedback, Safety Assessments, Structured Activity and Education    Response to Treatment Strategies:  Accepted Feedback, Listened, Focused on Goals, Attentive and Alert    Name of Group:  Wellness    Progress Note  Client presented with calm,even mood.Good focus and concentration during a discussion related to self esteem with a focus on making effective decisions. Client talked about decisions she needs to make regarding her living situation which her  is not in agreement with.      Is this a Weekly Review of the Progress on the Treatment Plan?  No

## 2017-02-22 ENCOUNTER — HOSPITAL ENCOUNTER (OUTPATIENT)
Dept: BEHAVIORAL HEALTH | Facility: CLINIC | Age: 82
End: 2017-02-22
Attending: PSYCHIATRY & NEUROLOGY
Payer: MEDICARE

## 2017-02-22 PROCEDURE — H2012 BEHAV HLTH DAY TREAT, PER HR: HCPCS

## 2017-02-22 NOTE — PROGRESS NOTES
"Group Therapy Progress Notes      Area of Treatment Focus:  Symptom Management, Personal Safety and Abstinence/Relapse Prevention     Therapeutic Interventions/Treatment Strategies:  Support, Limit/Boundaries, Safety Assessments, Problem Solving, Clarification and Education     Response to Treatment Strategies:  Accepted Feedback, Listened, Attentive and Distracted     Name of Group: Psychotherapy Group        Progress Note  Hour 1: Met with Luna to do tx planning. She also endorsed in group feelings of loss due to moving from her home of 55 years, feeling responsible for her , feeling older than peers in group, struggling with physical health problems and difficulty with daily ADL's. She set a goal to go to exercise group 1x a week, and talk about her concerns in group. She is now taking metro mobility reports less anxious about  driving in rush hour.    Hour 2: Discussed developing a \"treat list\", one could use to reward self when a difficult task has been accomplished, for example-taxes.               Is this a Weekly Review of the Progress on the Treatment Plan?  Yes.      Are Treatment Plan Goals being addressed?  Yes, continue treatment goals        Are Treatment Plan Strategies to Address Goals Effective?  Yes, continue treatment strategies        Are there any current contracts in place?  Yes. safety, no SI endorsed.       "

## 2017-02-27 ENCOUNTER — HOSPITAL ENCOUNTER (OUTPATIENT)
Dept: BEHAVIORAL HEALTH | Facility: CLINIC | Age: 82
End: 2017-02-27
Attending: PSYCHIATRY & NEUROLOGY
Payer: MEDICARE

## 2017-02-27 PROCEDURE — H2012 BEHAV HLTH DAY TREAT, PER HR: HCPCS

## 2017-02-27 NOTE — PROGRESS NOTES
"Group Therapy Progress Notes     Area of Treatment Focus:  Symptom Management, Community Resources/Discharge Planning, Abstinence/Relapse Prevention, Develop / Improve Independent Living Skills and Develop Socialization / Interpersonal Relationship Skills    Therapeutic Interventions/Treatment Strategies:  Support, Feedback, Structured Activity, Problem Solving, Clarification, Education, Motivational Enhancement Therapy and Cognitive Behavioral Therapy    Response to Treatment Strategies:  Accepted Feedback, Listened, Attentive, Alert and Demonstrates Behavior Change    Name of Group:  Psychotherapy Group      Progress Note  Hour 1: Hussein's affect looked brighter, she was smiling, better eye contact, less gazing out the window. She was more assertive with her check in. She stated, \" I feel like crap inside but glad I look better\". She endorsed tremulousness in hands, writer did not observe this, reported it was due to anxiety. She had a nice weekend-visited with both her sons. She is now taking metro mobility and less worried about David driving. She appeared to benefit from the feedback and smiled frequently. Reports she remains anxious and depressed.    Hour 2: Discussed avoiding black and white thinking when facing a fear or health crisis. Mobilizing resources, including importance of movement as way to adjunct medication therapy. All participated.            Is this a Weekly Review of the Progress on the Treatment Plan?  No     "

## 2017-02-27 NOTE — PROGRESS NOTES
"  Adult Mental Health Outpatient Group Therapy Progress Note     Client Initial Individualized Goals for Treatment: \" to get comfortable in my skin, start to have more energy to care for self\".    See Initial Treatment suggestions for the client during the time between Diagnostic Assessment and completion of the Master Individualized Treatment Plan:    Treatment Goals:     see above.     Area of Treatment Focus:  Symptom Management, Develop / Improve Independent Living Skills and Develop Socialization / Interpersonal Relationship Skills    Therapeutic Interventions/Treatment Strategies:  Support, Feedback, Safety Assessments, Structured Activity and Problem Solving    Response to Treatment Strategies:  Accepted Feedback, Gave Feedback, Listened, Focused on Goals, Attentive and Accepted Support    Name of Group:  Wellness     Description and Outcome:  Client attended and participated in a structured life skills group session where intervention focused on developing assertive communication skills. Client demonstrated significantly greater range of affect today than in previous session. She was more engaged, attentive, and offered greater amount of verbal feedback to group. Good task focus. Identified assertiveness as a challenging topic due to cultural norms of \"ladies my age\". Client identified benefits of learning more about various communication styles. Client would benefit from additional opportunities to practice and implement content from this session. Client addressed ITP goal number initial goal in this session.    Is this a Weekly Review of the Progress on the Treatment Plan?  No          "

## 2017-03-01 ENCOUNTER — HOSPITAL ENCOUNTER (OUTPATIENT)
Dept: BEHAVIORAL HEALTH | Facility: CLINIC | Age: 82
End: 2017-03-01
Attending: PSYCHIATRY & NEUROLOGY
Payer: MEDICARE

## 2017-03-01 PROCEDURE — H2012 BEHAV HLTH DAY TREAT, PER HR: HCPCS

## 2017-03-01 PROCEDURE — 99213 OFFICE O/P EST LOW 20 MIN: CPT | Performed by: NURSE PRACTITIONER

## 2017-03-01 NOTE — PROGRESS NOTES
"  Adult Mental Health Outpatient Group Therapy Progress Note     Client Initial Individualized Goals for Treatment: \" to get comfortable in my skin, start to have more energy to care for self\".       See Initial Treatment suggestions for the client during the time between Diagnostic Assessment and completion of the Master Individualized Treatment Plan.    Treatment Goals:   1. Client will use coping plan for safety, as needed.  2. Will report on symptoms and identify skills to use to manage depression, anxiety, low energy, poor appetite.  3. Will develop an aftercare / transition plan by increased engagement with family and community       Area of Treatment Focus:  Symptom Management, Abstinence/Relapse Prevention, Develop / Improve Independent Living Skills and Develop Socialization / Interpersonal Relationship Skills    Therapeutic Interventions/Treatment Strategies:  Support, Feedback, Structured Activity, Problem Solving, Clarification, Education, Motivational Enhancement Therapy and Cognitive Behavioral Therapy    Response to Treatment Strategies:  Accepted Feedback, Listened, Focused on Goals, Attentive, Alert and Demonstrates Behavior Change    Name of Group:  Psychotherapy Group       Description and Outcome:  Hussein engaged in problem solving as she fell trying to get on metro bus Monday. She was carrying both her purse and lunch bag and a cane. Her peers offered sound feedback to schedule with PCP for check up and to request order for a walker. Discussed benefit of using a walker and also bringing only 1 bag. At first Hussein was resistant as she stated, \" it is hard for me to ask for what I need\".  We reviewed this as a skill she could benefit from and directly impacts both her mental and physical health. She agreed she was hear to practice new skills and strategies and agreed to follow through ( #2 goal area). Did not endorse any SI today.    Is this a Weekly Review of the Progress on the Treatment Plan?  Yes.  "     Are Treatment Plan Goals being addressed?  Yes, continue treatment goals      Are Treatment Plan Strategies to Address Goals Effective?  Yes, continue treatment strategies      Are there any current contracts in place?  Yes. No SI endorsed.

## 2017-03-01 NOTE — H&P
DATE OF SERVICE: 3/1/2017                                             ATTENDING PROVIDER: Moon ROBLES DNP  LEGAL STATUS:  Voluntary  SOURCES OF INFORMATION: Information was obtained from the patient and available records.  REASON FOR ASSESSMENT: Admission to Senior Outpatient Program   HISTORY OF PRESENT ILLNESS: Luna Hassan is a 84 year old female referred to the [facility] by Dr. Alvarez when she was inpatient on [address] [place] earlier this year. Mrs. Hassan was diagnosed with depression in her early 70's. States there was no stressors at the time that she can remember, she woke up one day feeling depressed. Mrs. Hassan is a poor historian, she wasn't able to tell me when her current episode of the depression started and why. She didn't remember when she was hospitalized and what precipitated the admission. From the available records it looks like Mrs. Hassan had moved to an [place] in August of 2016 which she does not like. She also had multiple health issues that required short hospital stays. Mrs. Hassan has been feeling increasingly depressed and anxious, not sleeping and eating, isolating, unable to make any decisions, and take care of herself. She was having problems with her memory and concentration and was having suicidal ideation. She was was hospitalized for about a week in January of 2017. She has never attempted suicide.   Currently Mrs. Hassan feels her anxiety is going up again, states she had a fall few days ago and is now worried that she may fall again. Rates depression as moderate, anxiety moderate to high. Denies SI, hallucinations, delusions, and paranoia. Sleeps between 6-8 hours, however her energy and appetite remains low. Denies panic attacks although admits of feeling stack when her anxiety is high. Denies racing thoughts, rumination, suicidal and homicidal ideation, self injuries behaviors, memory problems, obsessions, compulsions, and manic  symptoms.   Mrs. Hassan has a PCP Dr. Jacques with whom she will make an appointment this week, psychiatrist Dr. Echols and therapist Flakito from [facility]. She is attending [facility] plus program and finds it very helpful.     SUBSTANCE USE HISTORY:   No history of substance use.    PSYCHIATRIC HISTORY:   Prior Psychiatric Diagnoses: yes, Depression   Psychiatric Hospitalizations: yes, multiple, the last one in January of 2017 at [facility], [address] [place] [address]   History of Psychosis none   Suicide Attempts none   Self-Injurious Behavior: none   Violence Toward Others none   History of ECT: yes, 2006 at [facility] and it was helpful.    Use of Psychotropics yes, doesn't remember what they are     PAST MEDICAL HISTORY:   Past Medical History   Diagnosis Date     Arthritis      right knee-     Congestive heart failure (H)      Coronary artery disease 2005     Depressive disorder      Hypertension      Past Surgical History   Procedure Laterality Date     Cardiac surgery  2006, 2009     4 stents     Colonoscopy       OK     Eye surgery  2016     bilateral cateracts       Denies seizures, head injuries, and loss of consciousness:  ALLERGIES:    Allergies   Allergen Reactions     Amlodipine Swelling     leg     Lisinopril Other (See Comments)     5 mg caused hypokalemia     Macrodantin [Nitrofurantoin] Hives     Penicillins Hives     Walnuts [Nuts] Swelling     Hard to breathe     FAMILY HISTORY:  Family History   Problem Relation Age of Onset     Depression Mother      Anxiety Disorder Mother      Unknown/Adopted Paternal Grandmother      Unknown/Adopted Paternal Grandfather      Depression Sister      Anxiety Disorder Son      Depression Brother      Anxiety Disorder Brother      Bipolar Disorder Brother        SOCIAL HISTORY:         Early history: Peter JOSEPH   Educational history: College degree from [employer], English major   Marital history:  for 60 years   Children: 2 sons   Current living situation: With [facility]   Occupational history:      Current financial support: [employer]    history: none   Legal history: none     MENTAL STATUS EXAM:      Appearance: awake, alert, well groomed, cooperative and no apparent distress  Attitude:  cooperative  Eye Contact:  good  Mood:  sad   Affect:  appropriate and in normal range  Speech:  clear, coherent  Psychomotor Behavior:  no evidence of tardive dyskinesia, dystonia, or tics  Throught Process:  logical  Associations:  no loose associations  Thought Content:  no evidence of suicidal ideation or homicidal ideation, no auditory hallucinations present and no visual hallucinations present  Insight:  good  Judgement:  intact  Oriented to:  time, person, and place  Attention Span and Concentration:  intact  Recent and Remote Memory:  intact  Language: no problems expressing self  Fund of Knowledge: adequate for the level of education and training.    DIAGNOSIS:  1. Major Depressive Disorder recurrent, moderate with anxious distress  CURRENT MEDICATIONS:   1. Lorazepam 0.5 mg qday, prn   2. Cymbalta 90 mg qhs  3. Mirtazepin 15 mg qhs  4. Hydroxyzine 25-50 mg qid, prn  PLAN AND RECOMMENDATIONS:  1. Continue Senior Outpatient Program. Patient finds the education and support the program provides helpful in keeping her symptoms under control.  2. The patient was encourage to follow up with PCP and Psychiatrist. I recommend taking Cymbalta in the morning as this medication might be activating and interfere with her sleep. Take Hydroxyzine prn for increased anxiety.   3. The patient was advised to let us know should she needs inpatient admission or further help.  4. The patient was advised to get involved in the community such as senior [facility] since she needs to expand her social connections in order to continue to improve.   4. Care was coordinated with the treatment team.  Attestation: Patient has been seen and evaluated by jefferson ROBLES DNP.  3/1/2017  3:57 PM

## 2017-03-02 NOTE — PROGRESS NOTES
Group Therapy Progress Notes     Area of Treatment Focus:  Develop / Improve Independent Living Skills    Therapeutic Interventions/Treatment Strategies:  Support, Redirection, Feedback, Structured Activity, Problem Solving, Clarification, Education and Motivational Enhancement Therapy    Response to Treatment Strategies:  Accepted Feedback, Gave Feedback, Listened, Focused on Goals, Attentive, Accepted Support, Alert and Demonstrates Behavior Change    Name of Group:  Coping with Depression    Progress Note  Reviewed what skills the group still wants to learn and they brainstormed the following list: knowing when to use which skill, integration of new skills into their lives, memory strategies, willfulness vs willingness, implementing and follow through, learning to be the master of their schedule, coping with anxiety and increasing confidence, how to reach out for support from each other.  Began teaching how to use a schedule to make a plan and to know what, and how, to prepare for what is coming up next on their schedule.   Luna reported that she is still shore and shaky from her fall just outside the building on Monday. She wants to learn more about coping with anxiety and increasing her confidence regarding coming here as she feels she is older than others and has different issues. The nurse was asked to review her symptoms from the fall to assess whether or not she needed to be further evaluated by a doctor.      Is this a Weekly Review of the Progress on the Treatment Plan?  No

## 2017-03-06 ENCOUNTER — HOSPITAL ENCOUNTER (OUTPATIENT)
Dept: BEHAVIORAL HEALTH | Facility: CLINIC | Age: 82
End: 2017-03-06
Attending: PSYCHIATRY & NEUROLOGY
Payer: MEDICARE

## 2017-03-06 PROCEDURE — H2012 BEHAV HLTH DAY TREAT, PER HR: HCPCS

## 2017-03-06 NOTE — PROGRESS NOTES
"  Adult Mental Health Outpatient Group Therapy Progress Note     Client Initial Individualized Goals for Treatment: \" to get comfortable in my skin, start to have more energy to care for self\".         See Initial Treatment suggestions for the client during the time between Diagnostic Assessment and completion of the Master Individualized Treatment Plan.     Treatment Goals:   1. Client will use coping plan for safety, as needed.  2. Will report on symptoms and identify skills to use to manage depression, anxiety, low energy, poor appetite.  3. Will develop an aftercare / transition plan by increased engagement with family and community     Area of Treatment Focus:  Symptom Management, Community Resources/Discharge Planning, Abstinence/Relapse Prevention, Develop / Improve Independent Living Skills and Develop Socialization / Interpersonal Relationship Skills    Therapeutic Interventions/Treatment Strategies:  Support, Feedback, Structured Activity, Problem Solving, Clarification, Education, Motivational Enhancement Therapy and Cognitive Behavioral Therapy    Response to Treatment Strategies:  Accepted Feedback, Gave Feedback, Listened, Focused on Goals, Attentive, Accepted Support, Alert and Demonstrates Behavior Change    Name of Group:  Psychotherapy Group     Hour 1: Hussein did go to the doctor due to fall last week. He found nothing wrong. She then bought a walker. She stated, \" I am afraid of so many things including using this walker\". She used group to problem solve how to get comfortable and has made a goal to practice walking up and down the hallways. She is making progress in that she is now taking metro mobility, followed peers suggestion and went to doctor, got herself a walker, will now practice using it. She is smiling more often. Still looks rather flat and deconditioned.    Hour 2:Hour 2: Writer shared some new meditation/mindfulness skills such as use of mudras to slow down breath      Description and " Outcome:  See above.     Is this a Weekly Review of the Progress on the Treatment Plan?  No

## 2017-03-06 NOTE — PROGRESS NOTES
"  Adult Mental Health Outpatient Group Therapy Progress Note     Client Initial Individualized Goals for Treatment: \" to get comfortable in my skin, start to have more energy to care for self\".         See Initial Treatment suggestions for the client during the time between Diagnostic Assessment and completion of the Master Individualized Treatment Plan.     Treatment Goals:   1. Client will use coping plan for safety, as needed.  2. Will report on symptoms and identify skills to use to manage depression, anxiety, low energy, poor appetite.  3. Will develop an aftercare / transition plan by increased engagement with family and community          Area of Treatment Focus:  Symptom Management and Develop / Improve Independent Living Skills    Therapeutic Interventions/Treatment Strategies:  Support, Redirection, Feedback, Structured Activity, Problem Solving, Clarification, Education and Motivational Enhancement Therapy    Response to Treatment Strategies:  Listened, Focused on Goals, Attentive, Alert and Demonstrates Behavior Change    Name of Group:  Mental Health Management     Description and Outcome:  Client identified her top three worries and wrote them down on her worksheet.  Using the provided handout on worry management, she highlighted strategies that she felt would work for her with these concerns. This client responded in the following way: we reviewed one of her concerns- fear of falling, and she started making a plan. Would have been nice to have more time.    Is this a Weekly Review of the Progress on the Treatment Plan?  No        "

## 2017-03-06 NOTE — PROGRESS NOTES
"Adult Mental Health Outpatient Group Therapy Progress Note                   Area of Treatment Focus:  Symptom Management, Community Resources/Discharge Planning, Abstinence/Relapse Prevention, Develop / Improve Independent Living Skills and Develop Socialization / Interpersonal Relationship Skills     Therapeutic Interventions/Treatment Strategies:  Support, Redirection, Feedback, Limit/Boundaries, Problem Solving, Clarification, Education and Cognitive Behavioral Therapy     Response to Treatment Strategies:  Accepted Feedback, Gave Feedback, Listened, Focused on Goals, Attentive, Accepted Support and Alert     Name of Group: Psychotherapy Group  Hour 1: Melodie appeared somewhat elevated in mood, at one point she stated, \" Oh I wish I could fly\". Writer gave her feedback that she appears anxious somewhat labile. She shared that she had written 18 pages of notes to herself over the weekend about how to manage symptoms and wanted to know if she could read some of it. Writer redirected her to check in about her mood today and a few ways she is coping. She shared she thinks the [place] is helping her and that she has been walking regularly as it calms her down. This is her goal. She responds to slight redirection and offers peers very good feedback.   Hour 2:Hour 2: Writer shared some new meditation/mindfulness skills such as use of mudras to slow down breath      Description and Outcome:  See above.     Is this a Weekly Review of the Progress on the Treatment Plan?  No                                 "

## 2017-03-08 ENCOUNTER — HOSPITAL ENCOUNTER (OUTPATIENT)
Dept: BEHAVIORAL HEALTH | Facility: CLINIC | Age: 82
End: 2017-03-08
Attending: PSYCHIATRY & NEUROLOGY
Payer: MEDICARE

## 2017-03-08 PROCEDURE — H2012 BEHAV HLTH DAY TREAT, PER HR: HCPCS

## 2017-03-08 NOTE — PROGRESS NOTES
"  Adult Mental Health Outpatient Group Therapy Progress Note     Client Initial Individualized Goals for Treatment: \" to get comfortable in my skin, start to have more energy to care for self\".         See Initial Treatment suggestions for the client during the time between Diagnostic Assessment and completion of the Master Individualized Treatment Plan.     Treatment Goals:   1. Client will use coping plan for safety, as needed.  2. Will report on symptoms and identify skills to use to manage depression, anxiety, low energy, poor appetite.  3. Will develop an aftercare / transition plan by increased engagement   Area of Treatment Focus:  Symptom Management, Personal Safety, Community Resources/Discharge Planning, Abstinence/Relapse Prevention, Develop / Improve Independent Living Skills and Develop Socialization / Interpersonal Relationship Skills     Therapeutic Interventions/Treatment Strategies:  Support, Feedback, Structured Activity, Problem Solving, Clarification, Education, Motivational Enhancement Therapy and Cognitive Behavioral Therapy     Response to Treatment Strategies:  Accepted Feedback, Gave Feedback, Listened, Focused on Goals, Attentive, Accepted Support, Alert and Demonstrates Behavior Change     Name of Group: Psychotherapy Group     Description and Outcome:  Hour 1: Hussein stated, \" I feel like hell today\". She is having a lot of anxiety and fear. She is afraid of using her new walker on inclines and declines as she fears she will fall. She fears how windy it has been. She fears general health concerns due to wt loss. Used group to problem solve walker. She will continue to practice in the hallways, ask metro  for assistance entering and exiting van, and perhaps ask her son to help her practice on uneven outside terrain. She will try to cut her ensure with milk to make it more palatable and eat some protein before bed to help her gain wt. She received much support from peers. Appeared " anxious,constricted, some gazing off into the distance, but able to use group to problem solve and get support.     Hour 2: The theme of fear came up for several members. Hussein expressed multiple fears. She appeared relieved to know this can be a symptom of depression and there are ways of coping. She was able to identify 1 skill to cope with fear of using her walker.    Is this a Weekly Review of the Progress on the Treatment Plan?  Yes.      Are Treatment Plan Goals being addressed?  Yes, continue treatment goals        Are Treatment Plan Strategies to Address Goals Effective?  Yes, continue treatment strategies        Are there any current contracts in place?  Yes. safety No suicidal ideation endorsed.

## 2017-03-08 NOTE — PROGRESS NOTES
"  Adult Mental Health Outpatient Group Therapy Progress Note   Client Initial Individualized Goals for Treatment: \" to get comfortable in my skin, start to have more energy to care for self\".         See Initial Treatment suggestions for the client during the time between Diagnostic Assessment and completion of the Master Individualized Treatment Plan.     Treatment Goals:   1. Client will use coping plan for safety, as needed.  2. Will report on symptoms and identify skills to use to manage depression, anxiety, low energy, poor appetite.  3. Will develop an aftercare / transition plan by increased engagement with family and community     Area of Treatment Focus:  Symptom Management, Develop / Improve Independent Living Skills and Develop Socialization / Interpersonal Relationship Skills    Therapeutic Interventions/Treatment Strategies:  Support, Redirection, Feedback, Structured Activity, Problem Solving, Clarification, Education and Motivational Enhancement Therapy    Response to Treatment Strategies:  Accepted Feedback, Gave Feedback, Listened, Focused on Goals, Attentive, Accepted Support, Alert and Demonstrates Behavior Change    Name of Group:  Mental Health Managment     Description and Outcome:  Memory Strategies: Luna identified having trouble with names and was able to make the plan of associating the person with a main facial feature or with a person who has that name. She may also ask them to spell it out.    Is this a Weekly Review of the Progress on the Treatment Plan?  No          "

## 2017-03-15 ENCOUNTER — HOSPITAL ENCOUNTER (OUTPATIENT)
Dept: BEHAVIORAL HEALTH | Facility: CLINIC | Age: 82
End: 2017-03-15
Attending: PSYCHIATRY & NEUROLOGY
Payer: MEDICARE

## 2017-03-15 PROCEDURE — H2012 BEHAV HLTH DAY TREAT, PER HR: HCPCS

## 2017-03-15 NOTE — PROGRESS NOTES
"  Adult Mental Health Outpatient Group Therapy Progress Note     Client Initial Individualized Goals for Treatment: \" to get comfortable in my skin, start to have more energy to care for self\".         See Initial Treatment suggestions for the client during the time between Diagnostic Assessment and completion of the Master Individualized Treatment Plan.     Treatment Goals:   1. Client will use coping plan for safety, as needed.  2. Will report on symptoms and identify skills to use to manage depression, anxiety, low energy, poor appetite.  3. Will develop an aftercare / transition plan by increased engagement   Area of Treatment Focus:  Symptom Management, Personal Safety, Community Resources/Discharge Planning, Abstinence/Relapse Prevention, Develop / Improve Independent Living Skills and Develop Socialization / Interpersonal Relationship Skills     Therapeutic Interventions/Treatment Strategies:  Support, Feedback, Structured Activity, Problem Solving, Clarification, Education, Motivational Enhancement Therapy and Cognitive Behavioral Therapy     Response to Treatment Strategies:  Accepted Feedback, Gave Feedback, Listened, Focused on Goals, Attentive, Accepted Support, Alert and Demonstrates Behavior Change     Name of Group: Psychotherapy Group     Description and Outcome:  Hour 1: Hussein remains very depressed and anxious. She is struggling with metro mobility as her ride home has been taking well over an hour resulting in increased anxiety. She reports no family available to provide a ride. We discussed other options to review. Also she is very anxious about getting clothes for spring. She remains too depressed to problem solve and reports it is helpful to use group. Suggested she bring in catalogue and use structure here to order clothing. Rest of hour she stared out the window, no eye contact, no feedback towards others. She agreed to continue to work on movement-use her walker in hallways and workout " room.    Hour 2: Hour 2: Discussed conflict resolution and various ways to approach conflict, ended with short meditation to help with concentration and focus.       Is this a Weekly Review of the Progress on the Treatment Plan?  Yes.      Are Treatment Plan Goals being addressed?  Yes, continue treatment goals        Are Treatment Plan Strategies to Address Goals Effective?  Yes, continue treatment strategies        Are there any current contracts in place?  Yes. safety No suicidal ideation endorsed.

## 2017-03-15 NOTE — PROGRESS NOTES
"  Adult Mental Health Outpatient Group Therapy Progress Note   Client Initial Individualized Goals for Treatment: \" to get comfortable in my skin, start to have more energy to care for self\".         See Initial Treatment suggestions for the client during the time between Diagnostic Assessment and completion of the Master Individualized Treatment Plan.     Treatment Goals:   1. Client will use coping plan for safety, as needed.  2. Will report on symptoms and identify skills to use to manage depression, anxiety, low energy, poor appetite.  3. Will develop an aftercare / transition plan by increased engagement with family and community     Area of Treatment Focus:  Symptom Management, Develop / Improve Independent Living Skills and Develop Socialization / Interpersonal Relationship Skills    Therapeutic Interventions/Treatment Strategies:  Support, Redirection, Feedback, Safety Assessments, Structured Activity, Education, Motivational Enhancement Therapy and Cognitive Behavioral Therapy    Response to Treatment Strategies:  Accepted Feedback, Gave Feedback, Listened, Focused on Goals, Attentive, Accepted Support, Alert and Demonstrates Behavior Change    Name of Group:  Mental Health Managment     Description and Outcome:  As a group we read, discussed and attempted to apply the following skills to real life situations: Distress tolerance and Radical Acceptance. Luna identified that she is needing to assert at her residence before she accepts the bad food she is given to eat, and committed to doing so.      Is this a Weekly Review of the Progress on the Treatment Plan?  No        "

## 2017-03-20 ENCOUNTER — HOSPITAL ENCOUNTER (OUTPATIENT)
Dept: BEHAVIORAL HEALTH | Facility: CLINIC | Age: 82
End: 2017-03-20
Attending: PSYCHIATRY & NEUROLOGY
Payer: MEDICARE

## 2017-03-20 PROCEDURE — H2012 BEHAV HLTH DAY TREAT, PER HR: HCPCS

## 2017-03-20 NOTE — PROGRESS NOTES
"  Adult Mental Health Outpatient Group Therapy Progress Note   Client Initial Individualized Goals for Treatment: \" to get comfortable in my skin, start to have more energy to care for self\".         See Initial Treatment suggestions for the client during the time between Diagnostic Assessment and completion of the Master Individualized Treatment Plan.     Treatment Goals:   1. Client will use coping plan for safety, as needed.  2. Will report on symptoms and identify skills to use to manage depression, anxiety, low energy, poor appetite.  3. Will develop an aftercare / transition plan by increased engagement with family and community       Area of Treatment Focus:  Symptom Management and Develop / Improve Independent Living Skills    Therapeutic Interventions/Treatment Strategies:  Support, Feedback, Structured Activity, Problem Solving, Clarification, Education, Motivational Enhancement Therapy and Cognitive Behavioral Therapy    Response to Treatment Strategies:  Accepted Feedback, Gave Feedback, Listened, Focused on Goals, Attentive, Accepted Support, Alert and Demonstrates Behavior Change    Name of Group:  Coping with depression     Description and Outcome:  Reviewed the handout on levels of depression and how to respond, identified relapse warning signs/symptoms, and reviewed positive coping strategies. Began to use a relapse planning sheet. Luna identified that her insurance will not pay for her to see her 1:1 along with group therapy. She decided she will go over key handouts with her when they resume.    Is this a Weekly Review of the Progress on the Treatment Plan?  No          "

## 2017-03-20 NOTE — PROGRESS NOTES
"Adult Mental Health Outpatient Group Therapy Progress Note         Client Initial Individualized Goals for Treatment: \" to get comfortable in my skin, start to have more energy to care for self\".           See Initial Treatment suggestions for the client during the time between Diagnostic Assessment and completion of the Master Individualized Treatment Plan.      Treatment Goals:   1. Client will use coping plan for safety, as needed.  2. Will report on symptoms and identify skills to use to manage depression, anxiety, low energy, poor appetite.  3. Will develop an aftercare / transition plan by increased engagement with family and community      Area of Treatment Focus:  Symptom Management, Community Resources/Discharge Planning, Abstinence/Relapse Prevention, Develop / Improve Independent Living Skills and Develop Socialization / Interpersonal Relationship Skills     Therapeutic Interventions/Treatment Strategies:  Support, Feedback, Structured Activity, Problem Solving, Clarification, Education, Motivational Enhancement Therapy and Cognitive Behavioral Therapy     Response to Treatment Strategies:  Accepted Feedback, Gave Feedback, Listened, Focused on Goals, Attentive, Accepted Support, Alert and Demonstrates Behavior Change     Name of Group: Psychotherapy Group      Hour 1: Hussein observed with better eye contact, better problem solving, less anxious although she reports she is still very depressed especially in the mornings. We met for tx planning, reviewed her goals. She reported the strucuture and education and support are all helping her to work on goals. She is using natural opportunities to reach out to others in the building and realizes she has to continue this. She acknowledged it is difficult to share her feelings in group as \" her generation did not do that\". Validated her ability to change and make changes that will improved her mental health. She offered others more feedback and was more attentive " overall.  Hour 2:Hour 2: Hour 2: Discussed the importance of sharing accomplishments as everyone shared some success story today.      Description and Outcome:  See above.      Is this a Weekly Review of the Progress on the Treatment Plan?  No

## 2017-03-20 NOTE — PROGRESS NOTES
"  Adult Mental Health Outpatient Group Therapy Progress Note     Client Initial Individualized Goals for Treatment: \" to get comfortable in my skin, start to have more energy to care for self\".         See Initial Treatment suggestions for the client during the time between Diagnostic Assessment and completion of the Master Individualized Treatment Plan.     Treatment Goals:   1. Client will use coping plan for safety, as needed.  2. Will report on symptoms and identify skills to use to manage depression, anxiety, low energy, poor appetite.  3. Will develop an aftercare / transition plan by increased engagement with family and community     Area of Treatment Focus:  Symptom Management, Community Resources/Discharge Planning, Abstinence/Relapse Prevention, Develop / Improve Independent Living Skills and Develop Socialization / Interpersonal Relationship Skills    Therapeutic Interventions/Treatment Strategies:  Support, Feedback, Structured Activity, Problem Solving, Clarification, Education, Motivational Enhancement Therapy and Cognitive Behavioral Therapy    Response to Treatment Strategies:  Accepted Feedback, Gave Feedback, Listened, Focused on Goals, Attentive, Accepted Support, Alert and Demonstrates Behavior Change    Name of Group:  Psychotherapy Group     Hour 1: Hussein observed with better eye contact, better problem solving, less anxious although she reports she is still very depressed especially in the mornings. We met for tx planning, reviewed her goals. She reported the strucuture and education and support are all helping her to work on goals. She is using natural opportunities to reach out to others in the building and realizes she has to continue this. She acknowledged it is difficult to share her feelings in group as \" her generation did not do that\". Validated her ability to change and make changes that will improved her mental health. She offered others more feedback and was more attentive " overall.  Hour 2:Hour 2: Hour 2: Discussed the importance of sharing accomplishments as everyone shared some success story today.     Description and Outcome:  See above.     Is this a Weekly Review of the Progress on the Treatment Plan?  No

## 2017-03-27 ENCOUNTER — HOSPITAL ENCOUNTER (OUTPATIENT)
Dept: BEHAVIORAL HEALTH | Facility: CLINIC | Age: 82
End: 2017-03-27
Attending: PSYCHIATRY & NEUROLOGY
Payer: MEDICARE

## 2017-03-27 PROCEDURE — H2012 BEHAV HLTH DAY TREAT, PER HR: HCPCS

## 2017-03-27 NOTE — PROGRESS NOTES
"  Adult Mental Health Outpatient Group Therapy Progress Note     Client Initial Individualized Goals for Treatment: \" to get comfortable in my skin, start to have more energy to care for self\".         See Initial Treatment suggestions for the client during the time between Diagnostic Assessment and completion of the Master Individualized Treatment Plan.     Treatment Goals:   1. Client will use coping plan for safety, as needed.  2. Will report on symptoms and identify skills to use to manage depression, anxiety, low energy, poor appetite.  3. Will develop an aftercare / transition plan by increased engagement   Area of Treatment Focus:  Symptom Management, Personal Safety, Community Resources/Discharge Planning, Abstinence/Relapse Prevention, Develop / Improve Independent Living Skills and Develop Socialization / Interpersonal Relationship Skills     Therapeutic Interventions/Treatment Strategies:  Support, Feedback, Structured Activity, Problem Solving, Clarification, Education, Motivational Enhancement Therapy and Cognitive Behavioral Therapy     Response to Treatment Strategies:  Accepted Feedback, Gave Feedback, Listened, Focused on Goals, Attentive, Accepted Support, Alert and Demonstrates Behavior Change     Name of Group: Mental health management &  Psychotherapy Group         Description and Outcome:  Mental health management - Presented topic on neuroplasticity and depression, all engaged and participated, verbalized understanding material and how to apply it.        Hour 1: Hussein shared memories of her older sister who passed away last week. She shared that she was a nun and entered [employer] at age 15. She was an educator and was firm but fair with students and they often returned to visit her. Hussein will travel to [place] for the  tomorrow with her children and . She appeared to appreciate opportunity to talk about her sister, and was engaged in listening to peers as well. Still looks " blunted, depressed.   Hour 2: Discussed using ordinary tasks as opportunities to practice mindfulness based on topic discussion this morning.      Is this a Weekly Review of the Progress on the Treatment Plan?  No

## 2017-04-03 ENCOUNTER — HOSPITAL ENCOUNTER (OUTPATIENT)
Dept: BEHAVIORAL HEALTH | Facility: CLINIC | Age: 82
End: 2017-04-03
Attending: PSYCHIATRY & NEUROLOGY
Payer: MEDICARE

## 2017-04-03 PROCEDURE — H2012 BEHAV HLTH DAY TREAT, PER HR: HCPCS

## 2017-04-03 NOTE — PROGRESS NOTES
"  Adult Mental Health Outpatient Group Therapy Progress Note   Client Initial Individualized Goals for Treatment: \" to get comfortable in my skin, start to have more energy to care for self\".         See Initial Treatment suggestions for the client during the time between Diagnostic Assessment and completion of the Master Individualized Treatment Plan.     Treatment Goals:   1. Client will use coping plan for safety, as needed.  2. Will report on symptoms and identify skills to use to manage depression, anxiety, low energy, poor appetite.  3. Will develop an aftercare / transition plan by increased engagement with family and community          Area of Treatment Focus:  Symptom Management and Personal Safety    Therapeutic Interventions/Treatment Strategies:  Support, Redirection, Feedback, Safety Assessments, Structured Activity, Clarification and Motivational Enhancement Therapy    Response to Treatment Strategies:  Accepted Feedback, Gave Feedback, Listened, Focused on Goals, Attentive, Accepted Support, Alert and Demonstrates Behavior Change    Name of Group:  Group Psychotherapy     Description and Outcome:  Session One: Luna went to the  of her oldest sister who was a nun for over 70 years, in [place], accompanied with all of her family, returning on Thursday. It was difficult for her to get in and out of the big vehicle that her son has. It was difficult that she didn't get the call until a day  After she passed because she didn't have the right number for Luna and they had to find her through her son that they knew worked for [employer]. She enjoyed the stories, the welcome by the sisters, especially with her young children. It was reassuring to her that her sons and their whole families were able to be at this  with her. It is disorienting to be the oldest of her family now. She struggles to be the elder, since she is used to being the youngest in the family. Her sister had dementia. " Luna continues to lose weight.    Session Two: oriented the new group member by reading lessons from the geese and using a handout on how to give feedback and how to check in.    Is this a Weekly Review of the Progress on the Treatment Plan?  No

## 2017-04-05 ENCOUNTER — HOSPITAL ENCOUNTER (OUTPATIENT)
Dept: BEHAVIORAL HEALTH | Facility: CLINIC | Age: 82
End: 2017-04-05
Attending: PSYCHIATRY & NEUROLOGY
Payer: MEDICARE

## 2017-04-05 PROCEDURE — H2012 BEHAV HLTH DAY TREAT, PER HR: HCPCS

## 2017-04-05 NOTE — PROGRESS NOTES
"  Adult Mental Health Outpatient Group Therapy Progress Note     Client Initial Individualized Goals for Treatment: \" to get comfortable in my skin, start to have more energy to care for self\".         See Initial Treatment suggestions for the client during the time between Diagnostic Assessment and completion of the Master Individualized Treatment Plan.     Treatment Goals:   1. Client will use coping plan for safety, as needed.  2. Will report on symptoms and identify skills to use to manage depression, anxiety, low energy, poor appetite.  3. Will develop an aftercare / transition plan by increased engagement   Area of Treatment Focus:  Symptom Management, Personal Safety, Community Resources/Discharge Planning, Abstinence/Relapse Prevention, Develop / Improve Independent Living Skills and Develop Socialization / Interpersonal Relationship Skills     Therapeutic Interventions/Treatment Strategies:  Support, Feedback, Structured Activity, Problem Solving, Clarification, Education, Motivational Enhancement Therapy and Cognitive Behavioral Therapy     Response to Treatment Strategies:  Accepted Feedback, Gave Feedback, Listened, Focused on Goals, Attentive, Accepted Support, Alert and Demonstrates Behavior Change     Name of Group: Psychotherapy Group     Description and Outcome:  Hour 1: Hussein reported improvement in mood. She handled traveling to her sister's  and stated, \" I am very glad that I went\". She made good eye contact able to problem solve in that she ordered some spring clothing and was wearing new outfit. This is improvement in functioning. She continues to use [employer]. , and walk halls.   Hour 2: Discussed using breath as a resource to calm mind, slow down heart rate and connect mind and body. Practiced breathing skill and gentle movement. Client demonstrated understanding of session content by identifying previous practice as tool to use to cope with stress and or anxiety     Is this a Weekly " Review of the Progress on the Treatment Plan?  Yes.      Are Treatment Plan Goals being addressed?  Yes, continue treatment goals        Are Treatment Plan Strategies to Address Goals Effective?  Yes, continue treatment strategies        Are there any current contracts in place?  Yes. safety No suicidal ideation endorsed.

## 2017-04-10 ENCOUNTER — HOSPITAL ENCOUNTER (OUTPATIENT)
Dept: BEHAVIORAL HEALTH | Facility: CLINIC | Age: 82
End: 2017-04-10
Attending: PSYCHIATRY & NEUROLOGY
Payer: MEDICARE

## 2017-04-10 PROCEDURE — H2012 BEHAV HLTH DAY TREAT, PER HR: HCPCS

## 2017-04-10 NOTE — PROGRESS NOTES
"  Adult Mental Health Outpatient Group Therapy Progress Note     Client Initial Individualized Goals for Treatment: \" to get comfortable in my skin, start to have more energy to care for self\".         See Initial Treatment suggestions for the client during the time between Diagnostic Assessment and completion of the Master Individualized Treatment Plan.     Treatment Goals:   1. Client will use coping plan for safety, as needed.  2. Will report on symptoms and identify skills to use to manage depression, anxiety, low energy, poor appetite.  3. Will develop an aftercare / transition plan by increased engagement   Area of Treatment Focus:  Symptom Management, Personal Safety, Community Resources/Discharge Planning, Abstinence/Relapse Prevention, Develop / Improve Independent Living Skills and Develop Socialization / Interpersonal Relationship Skills     Therapeutic Interventions/Treatment Strategies:  Support, Feedback, Structured Activity, Problem Solving, Clarification, Education, Motivational Enhancement Therapy and Cognitive Behavioral Therapy     Response to Treatment Strategies:  Accepted Feedback, Gave Feedback, Listened, Focused on Goals, Attentive, Accepted Support, Alert and Demonstrates Behavior Change     Name of Group: Psychotherapy Group     Description and Outcome:  Hour 1: Hussein reported feeling anxious today. She is worried about recent results of CT scan. She expressed anxiety that her land line does not allow for VM so she missed several messages. Engaged in problem solving around this. Hussein showed sense of humor about her disdain for IT. Her peers appreciated it. 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 She felt proud of herself. Her goal she wants to focus on is - gaining weight. Peers offered good suggestions as to how to get more calories, and Hussein reported this was helpful.   Hour 2: Discussed topic of \" shame\", as it relates " to depression. All members shared an example of how they reduced their own sense of isolation by talking about topics they were ashamed of, for example: feeling suicidal, feeling depressed, having to move to [place] etc. All participated.        Is this a Weekly Review of the Progress on the Treatment Plan?  Yes.      Are Treatment Plan Goals being addressed?  Yes, continue treatment goals        Are Treatment Plan Strategies to Address Goals Effective?  Yes, continue treatment strategies        Are there any current contracts in place?  Yes. safety No suicidal ideation endorsed.

## 2017-04-10 NOTE — PROGRESS NOTES
"  Adult Mental Health Outpatient Group Therapy Progress Note   Client Initial Individualized Goals for Treatment: \" to get comfortable in my skin, start to have more energy to care for self\".         See Initial Treatment suggestions for the client during the time between Diagnostic Assessment and completion of the Master Individualized Treatment Plan.     Treatment Goals:   1. Client will use coping plan for safety, as needed.  2. Will report on symptoms and identify skills to use to manage depression, anxiety, low energy, poor appetite.  3. Will develop an aftercare / transition plan by increased engagement with family and community            Area of Treatment Focus:  Symptom Management, Develop / Improve Independent Living Skills and Develop Socialization / Interpersonal Relationship Skills    Therapeutic Interventions/Treatment Strategies:  Support, Feedback, Structured Activity, Clarification, Education and Motivational Enhancement Therapy    Response to Treatment Strategies:  Accepted Feedback, Gave Feedback, Listened, Focused on Goals, Attentive, Accepted Support, Alert and Demonstrates Behavior Change    Name of Group:  Mental Health Managment     Description and Outcome:  Started to read and discuss handout on the management of worry. Luna identified the following concerns: getting the results of her CT scan today or tomorrow, and dealing with [employer]; and was able to highlight several strategies to address this concern.      Is this a Weekly Review of the Progress on the Treatment Plan?  No          "

## 2017-04-12 ENCOUNTER — HOSPITAL ENCOUNTER (OUTPATIENT)
Dept: BEHAVIORAL HEALTH | Facility: CLINIC | Age: 82
End: 2017-04-12
Attending: PSYCHIATRY & NEUROLOGY
Payer: MEDICARE

## 2017-04-12 PROCEDURE — H2012 BEHAV HLTH DAY TREAT, PER HR: HCPCS

## 2017-04-12 NOTE — PROGRESS NOTES
"  Adult Mental Health Outpatient Group Therapy Progress Note     Client Initial Individualized Goals for Treatment: \" to get comfortable in my skin, start to have more energy to care for self\".         See Initial Treatment suggestions for the client during the time between Diagnostic Assessment and completion of the Master Individualized Treatment Plan.     Treatment Goals:   1. Client will use coping plan for safety, as needed.  2. Will report on symptoms and identify skills to use to manage depression, anxiety, low energy, poor appetite.  3. Will develop an aftercare / transition plan by increased engagement with family and community          Area of Treatment Focus:  Symptom Management, Develop / Improve Independent Living Skills and Develop Socialization / Interpersonal Relationship Skills    Therapeutic Interventions/Treatment Strategies:  Support, Structured Activity, Clarification and Education    Response to Treatment Strategies:  Listened, Focused on Goals, Attentive, Accepted Support, Alert and Demonstrates Behavior Change    Name of Group:  Mental Health Managment     Description and Outcome:    Using the provided handout on worry management, they highlighted strategies that they felt would work for them with their concerns. This client responded in the following way: discussed, highlighted strategies she will use when worried. She said it was a helpful handout and she will refer to it in her future, as needed.      Is this a Weekly Review of the Progress on the Treatment Plan?  No          "

## 2017-04-12 NOTE — PROGRESS NOTES
"  Adult Mental Health Outpatient Group Therapy Progress Note     Client Initial Individualized Goals for Treatment: \" to get comfortable in my skin, start to have more energy to care for self\".         See Initial Treatment suggestions for the client during the time between Diagnostic Assessment and completion of the Master Individualized Treatment Plan.     Treatment Goals:   1. Client will use coping plan for safety, as needed.  2. Will report on symptoms and identify skills to use to manage depression, anxiety, low energy, poor appetite.  3. Will develop an aftercare / transition plan by increased engagement   Area of Treatment Focus:  Symptom Management, Personal Safety, Community Resources/Discharge Planning, Abstinence/Relapse Prevention, Develop / Improve Independent Living Skills and Develop Socialization / Interpersonal Relationship Skills     Therapeutic Interventions/Treatment Strategies:  Support, Feedback, Structured Activity, Problem Solving, Clarification, Education, Motivational Enhancement Therapy and Cognitive Behavioral Therapy     Response to Treatment Strategies:  Accepted Feedback, Gave Feedback, Listened, Focused on Goals, Attentive, Accepted Support, Alert and Demonstrates Behavior Change     Name of Group: Psychotherapy Group     Description and Outcome:  Hour 1: Hussein again verbalized anxiety stating, \" sometimes I feel like I can't even stay on my feet\". She had current difficulties engaging in problem solving specifically about finding clothing that fits her. She becomes very anxious about this and cannot seem to problem solve. She ordered some clothing but too tight so she feels stuck again. She also shared her shame about physical veronica problems resulting in incontinence of urine and bowel and that most evenings she is busy keeping herself clean. A peer validated her concerns by telling her she struggles with same issue. Hussein was able to show humor and smiled and tends to enjoy the " "attention she gets from peers for her dry humor. Writer helped her see this as a strength. Hussein also expressed determination that she wants to feel better and is willing to continue to get on [employer] and do what she has to do. She is working to gain wt. She denied any suicidal thinking stating, \" I really want to get better\".   Hour 2: Group engaged in larger discussion about stigma of living with a mental illness and the importance of avoiding feeling ashamed and that sharing personal stories not only helps self but can help others. All participated  Is this a Weekly Review of the Progress on the Treatment Plan?  Yes.      Are Treatment Plan Goals being addressed?  Yes, continue treatment goals        Are Treatment Plan Strategies to Address Goals Effective?  Yes, continue treatment strategies        Are there any current contracts in place?  Yes. safety No suicidal ideation endorsed.              "

## 2017-04-13 NOTE — PROGRESS NOTES
"  Adult Mental Health Outpatient Group Therapy Progress Note     Client Initial Individualized Goals for Treatment: \" to get comfortable in my skin, start to have more energy to care for self\".    See Initial Treatment suggestions for the client during the time between Diagnostic Assessment and completion of the Master Individualized Treatment Plan:    Treatment Goals:    1. Client will use coping plan for safety, as needed.    2. Will report on symptoms and identify skills to use to manage depression, anxiety, low energy, poor appetite.    3. Will develop an aftercare / transition plan by increased engagement with family and community     Area of Treatment Focus:  Symptom Management, Develop / Improve Independent Living Skills and Develop Socialization / Interpersonal Relationship Skills    Therapeutic Interventions/Treatment Strategies:  Support, Feedback, Safety Assessments, Structured Activity and Problem Solving    Response to Treatment Strategies:  Accepted Feedback, Gave Feedback, Listened, Focused on Goals, Attentive and Accepted Support    Name of Group:  Wellness     Description and Outcome:  Pt attended and participated in a structured life skills group where intervention focused on coping through structured hands-on activities to improve function in valued roles, routines, and independent living skills. Client had a calm, even affect in session. Client was self directed with ongoing task in session. She demonstrated adequate task focus to participate in activity and was able to follow multi step directions for a novel task. Provided assistance to a peer that was having difficulty with similar activity. Client demonstrated understanding of session content by increased independence with activity in session. Client addressed ITP goal number 2 in this session.   Is this a Weekly Review of the Progress on the Treatment Plan?  No          "

## 2017-04-24 ENCOUNTER — HOSPITAL ENCOUNTER (OUTPATIENT)
Dept: BEHAVIORAL HEALTH | Facility: CLINIC | Age: 82
End: 2017-04-24
Attending: PSYCHIATRY & NEUROLOGY
Payer: MEDICARE

## 2017-04-24 PROCEDURE — H2012 BEHAV HLTH DAY TREAT, PER HR: HCPCS

## 2017-04-24 NOTE — PROGRESS NOTES
"  Adult Mental Health Outpatient Group Therapy Progress Note   Client Initial Individualized Goals for Treatment: \" to get comfortable in my skin, start to have more energy to care for self\".         See Initial Treatment suggestions for the client during the time between Diagnostic Assessment and completion of the Master Individualized Treatment Plan.     Treatment Goals:   1. Client will use coping plan for safety, as needed.  2. Will report on symptoms and identify skills to use to manage depression, anxiety, low energy, poor appetite.  3. Will develop an aftercare / transition plan by increased engagement with family and community          Area of Treatment Focus:  Symptom Management, Abstinence/Relapse Prevention and Develop / Improve Independent Living Skills    Therapeutic Interventions/Treatment Strategies:  Support, Feedback, Structured Activity, Problem Solving, Clarification, Education and Motivational Enhancement Therapy    Response to Treatment Strategies:  Listened, Focused on Goals, Attentive, Accepted Support, Alert and Demonstrates Behavior Change    Name of Group:  Mental Health Managment     Description and Outcome:  Reading from provided handout, client highlighted her identified relapse symptoms, along with strategies to cope with depression and other relapse symptoms. We will continue to work towards a relapse coping plan.    Is this a Weekly Review of the Progress on the Treatment Plan?  No          "

## 2017-04-26 ENCOUNTER — HOSPITAL ENCOUNTER (OUTPATIENT)
Dept: BEHAVIORAL HEALTH | Facility: CLINIC | Age: 82
End: 2017-04-26
Attending: PSYCHIATRY & NEUROLOGY
Payer: MEDICARE

## 2017-04-26 PROCEDURE — H2012 BEHAV HLTH DAY TREAT, PER HR: HCPCS

## 2017-04-26 NOTE — PROGRESS NOTES
"  Adult Mental Health Outpatient Group Therapy Progress Note     Client Initial Individualized Goals for Treatment: \" to get comfortable in my skin, start to have more energy to care for self\".         See Initial Treatment suggestions for the client during the time between Diagnostic Assessment and completion of the Master Individualized Treatment Plan.     Treatment Goals:   1. Client will use coping plan for safety, as needed.  2. Will report on symptoms and identify skills to use to manage depression, anxiety, low energy, poor appetite.  3. Will develop an aftercare / transition plan by increased engagement with family and community          Area of Treatment Focus:  Symptom Management, Abstinence/Relapse Prevention and Develop / Improve Independent Living Skills    Therapeutic Interventions/Treatment Strategies:  Support, Redirection, Feedback, Safety Assessments, Structured Activity, Clarification, Education and Motivational Enhancement Therapy    Response to Treatment Strategies:  Accepted Feedback, Gave Feedback, Listened, Focused on Goals, Attentive, Accepted Support, Alert and Demonstrates Behavior Change    Name of Group:  Mental Health Management     Description and Outcome:  Completed reading handout on managing depression, made a written plan they could refer to in the future, and gave her a copy of her plan for coping with suicidal thoughts.    Is this a Weekly Review of the Progress on the Treatment Plan?  No          "

## 2017-04-26 NOTE — PROGRESS NOTES
"  Adult Mental Health Outpatient Group Therapy Progress Note     Client Initial Individualized Goals for Treatment: \" to get comfortable in my skin, start to have more energy to care for self\".         See Initial Treatment suggestions for the client during the time between Diagnostic Assessment and completion of the Master Individualized Treatment Plan.     Treatment Goals:   1. Client will use coping plan for safety, as needed.  2. Will report on symptoms and identify skills to use to manage depression, anxiety, low energy, poor appetite.  3. Will develop an aftercare / transition plan by increased engagement   Area of Treatment Focus:  Symptom Management, Personal Safety, Community Resources/Discharge Planning, Abstinence/Relapse Prevention, Develop / Improve Independent Living Skills and Develop Socialization / Interpersonal Relationship Skills     Therapeutic Interventions/Treatment Strategies:  Support, Feedback, Structured Activity, Problem Solving, Clarification, Education, Motivational Enhancement Therapy and Cognitive Behavioral Therapy     Response to Treatment Strategies:  Accepted Feedback, Gave Feedback, Listened, Focused on Goals, Attentive, Accepted Support, Alert and Demonstrates Behavior Change     Name of Group: Psychotherapy Group     Description and Outcome:  Hour 1: Hussein reported she is having high anxiety day, more so than typical. She identified following worries: David is out driving around doing shopping today ( she depends on him and worries about his driving skills); she has doctor appointment Monday regarding cardiac care; and she has a confirmation party to attend this Sunday. She was able to recognize these triggers and we reviewed her strengths. She appeared better after talking about her concerns.  Hour 2: Practiced some breathing skills and mindful movement to address anxiety and feeling restless.  Hussein participated but remained anxious.       Is this a Weekly Review of the Progress on " the Treatment Plan?  Yes.      Are Treatment Plan Goals being addressed?  Yes, continue treatment goals        Are Treatment Plan Strategies to Address Goals Effective?  Yes, continue treatment strategies        Are there any current contracts in place?  Yes. safety No suicidal ideation endorsed.

## 2017-04-27 NOTE — PROGRESS NOTES
"  Adult Mental Health Outpatient Group Therapy Progress Note     Client Initial Individualized Goals for Treatment: \" to get comfortable in my skin, start to have more energy to care for self\".    See Initial Treatment suggestions for the client during the time between Diagnostic Assessment and completion of the Master Individualized Treatment Plan:    Treatment Goals:    1. Client will use coping plan for safety, as needed.    2. Will report on symptoms and identify skills to use to manage depression, anxiety, low energy, poor appetite.    3. Will develop an aftercare / transition plan by increased engagement with family and community     Area of Treatment Focus:  Symptom Management, Develop / Improve Independent Living Skills and Develop Socialization / Interpersonal Relationship Skills    Therapeutic Interventions/Treatment Strategies:  Support, Feedback, Safety Assessments, Structured Activity and Problem Solving    Response to Treatment Strategies:  Accepted Feedback, Gave Feedback, Listened, Focused on Goals, Attentive and Accepted Support    Name of Group:  Wellness     Description and Outcome:  Client attended and participated in a life skills group session where intervention focused on functional improvements in social skills and communication with others. Client had a calm, even affect. She actively participated in structured group activity that had demands including critical thinking, problem solving, assertive communication, and conflict resolution skills. Interacted easily with peers in session. Client addressed ITP goal number 2 in this session.     Is this a Weekly Review of the Progress on the Treatment Plan?  No          "

## 2017-05-08 ENCOUNTER — HOSPITAL ENCOUNTER (OUTPATIENT)
Dept: BEHAVIORAL HEALTH | Facility: CLINIC | Age: 82
End: 2017-05-08
Attending: NURSE PRACTITIONER
Payer: MEDICARE

## 2017-05-08 PROCEDURE — H2012 BEHAV HLTH DAY TREAT, PER HR: HCPCS

## 2017-05-08 NOTE — PROGRESS NOTES
"  Adult Mental Health Outpatient Group Therapy Progress Note     Client Initial Individualized Goals for Treatment: \" to get comfortable in my skin, start to have more energy to care for self\".         See Initial Treatment suggestions for the client during the time between Diagnostic Assessment and completion of the Master Individualized Treatment Plan.     Treatment Goals:   1. Client will use coping plan for safety, as needed.  2. Will report on symptoms and identify skills to use to manage depression, anxiety, low energy, poor appetite.  3. Will develop an aftercare / transition plan by increased engagement   Area of Treatment Focus:  Symptom Management, Personal Safety, Community Resources/Discharge Planning, Abstinence/Relapse Prevention, Develop / Improve Independent Living Skills and Develop Socialization / Interpersonal Relationship Skills     Therapeutic Interventions/Treatment Strategies:  Support, Feedback, Structured Activity, Problem Solving, Clarification, Education, Motivational Enhancement Therapy and Cognitive Behavioral Therapy     Response to Treatment Strategies:  Accepted Feedback, Gave Feedback, Listened, Focused on Goals, Attentive, Accepted Support, Alert and Demonstrates Behavior Change     Name of Group: Psychotherapy Group and Mental health management       Description and Outcome:  Mental health management: Group viewed Sydni SANCHEZ talk on vulnerability and shame. Hussein Client verbalized understanding of session content by listened intently did not offer much in discussion but was alert and engaged.     Hour 1:Hussein has been worried about her physical health. She saw her cardiologist x2 last week and underwent a stress test which she reported \" wiped her out\". Thus she missed programming last week. She found out she most likely had a heart attack in the past that she never knew about. Discussed the importance of her sons to her emotional well being and that she feels calmer when they " visit and/or she stays in touch daily with them- resilience. She continues to look emotionally flat, frail, but displays humor and listens in engaged manner.  Hour 2: Overall theme of group was autonomy and independence in context of dealing with major mental illness. Hussein expressed gratitude for her sons whom support her and whom she supports and for her  as these are her support system. She continues to try to do as much for herself as possible such as attending programming, taking metro, walking halls.       Is this a Weekly Review of the Progress on the Treatment Plan?  Yes.      Are Treatment Plan Goals being addressed?  Yes, continue treatment goals        Are Treatment Plan Strategies to Address Goals Effective?  Yes, continue treatment strategies        Are there any current contracts in place?  Yes. safety No suicidal ideation endorsed.

## 2017-05-10 ENCOUNTER — HOSPITAL ENCOUNTER (OUTPATIENT)
Dept: BEHAVIORAL HEALTH | Facility: CLINIC | Age: 82
End: 2017-05-10
Attending: NURSE PRACTITIONER
Payer: MEDICARE

## 2017-05-10 PROCEDURE — H2012 BEHAV HLTH DAY TREAT, PER HR: HCPCS

## 2017-05-10 NOTE — PROGRESS NOTES
"  Adult Mental Health Outpatient Group Therapy Progress Note     Client Initial Individualized Goals for Treatment: \" to get comfortable in my skin, start to have more energy to care for self\".         See Initial Treatment suggestions for the client during the time between Diagnostic Assessment and completion of the Master Individualized Treatment Plan.     Treatment Goals:   1. Client will use coping plan for safety, as needed.  2. Will report on symptoms and identify skills to use to manage depression, anxiety, low energy, poor appetite.  3. Will develop an aftercare / transition plan by increased engagement   Area of Treatment Focus:  Symptom Management, Personal Safety, Community Resources/Discharge Planning, Abstinence/Relapse Prevention, Develop / Improve Independent Living Skills and Develop Socialization / Interpersonal Relationship Skills     Therapeutic Interventions/Treatment Strategies:  Support, Feedback, Structured Activity, Problem Solving, Clarification, Education, Motivational Enhancement Therapy and Cognitive Behavioral Therapy     Response to Treatment Strategies:  Accepted Feedback, Gave Feedback, Listened, Focused on Goals, Attentive, Accepted Support, Alert and Demonstrates Behavior Change     Name of Group: Psychotherapy Group     Description and Outcome:  Hour 1: Hussein engaged in problem solving. She is embarrassed about some fecal incontinence that she is having and made a plan to discuss further options with her PCP as she has prescribed stool softeners which Hussein feels are causing the incontinence. She will talk to her about it at next visit. She also made plan to contact \"Antonia\", the AL coordinator to schedule a time that her son Juan Carlos could make to talk about the services AL could provide and the cost and whether she and her  would have to move from their apartment in the facility or if they could get the services without moving to the designated AL apartments. She has gained some " weight, her affect looks brighter, she remains anxious with ruminations and is easily overwhelmed. Verbalized she felt better after talking about her concerns and developing plan of action.  Hour 2: Further theme that group talked about was isolation. Hussein reported that her physical health issues cause her to feel isolated as she is embarrassed about them and does not share her frustrations with others.     Is this a Weekly Review of the Progress on the Treatment Plan?  Yes.      Are Treatment Plan Goals being addressed?  Yes, continue treatment goals        Are Treatment Plan Strategies to Address Goals Effective?  Yes, continue treatment strategies        Are there any current contracts in place?  Yes. safety No suicidal ideation endorsed.

## 2017-05-10 NOTE — PROGRESS NOTES
Group Therapy Progress Notes     Area of Treatment Focus:  Symptom Management, Develop / Improve Independent Living Skills, Develop Socialization / Interpersonal Relationship Skills and Cultural / Spirituality    Therapeutic Interventions/Treatment Strategies:  Support, Redirection, Feedback, Structured Activity, Clarification, Education and Cognitive Behavioral Therapy    Response to Treatment Strategies:  Accepted Feedback, Gave Feedback, Listened, Focused on Goals, Attentive, Accepted Support, Alert and Demonstrates Behavior Change    Name of Group:  Wellness    Progress Note  Reading from handout on Cognitive Dysfunctions, this client discussed and chose to highlight more reasonable beliefs to counteract those that they sometimes think that are dysfunctional. Luna saw many areas that she struggles within, such as dependence, blame proneness, high expectation, etc. We discussed some aspects of discerning when she might qualify for Assisted Living and for in home assistance. Currently she does not qualify for either. She was encouraged to make use of the Senior Linkage Line with help from her sons.      Is this a Weekly Review of the Progress on the Treatment Plan?  No

## 2017-05-15 ENCOUNTER — HOSPITAL ENCOUNTER (OUTPATIENT)
Dept: BEHAVIORAL HEALTH | Facility: CLINIC | Age: 82
End: 2017-05-15
Attending: NURSE PRACTITIONER
Payer: MEDICARE

## 2017-05-15 PROCEDURE — H2012 BEHAV HLTH DAY TREAT, PER HR: HCPCS

## 2017-05-15 NOTE — PROGRESS NOTES
Group Therapy Progress Notes     Area of Treatment Focus:  Symptom Management and Develop Socialization / Interpersonal Relationship Skills    Therapeutic Interventions/Treatment Strategies:  Support, Feedback, Structured Activity, Problem Solving, Clarification, Education and Motivational Enhancement Therapy    Response to Treatment Strategies:  Accepted Feedback, Gave Feedback, Listened, Focused on Goals, Attentive, Accepted Support, Alert and Demonstrates Behavior Change    Name of Group:  Mental Health Management    Progress Note  Read, discussed and highlighted a handout on levels of dependency and how to move toward healthy relationships, away from the rescue triangle, and toward assertive and self responsible relationships. Luna highlighted a lot but also felt the handout was too extreme in it's presentation and stated that this concept is rather a new one, and she hadn't ever been able to know other ways of being in a relationship than to be dependent on her .      Is this a Weekly Review of the Progress on the Treatment Plan?  No

## 2017-05-15 NOTE — PROGRESS NOTES
"  Adult Mental Health Outpatient Group Therapy Progress Note     Client Initial Individualized Goals for Treatment: \" to get comfortable in my skin, start to have more energy to care for self\".         See Initial Treatment suggestions for the client during the time between Diagnostic Assessment and completion of the Master Individualized Treatment Plan.     Treatment Goals:   1. Client will use coping plan for safety, as needed.  2. Will report on symptoms and identify skills to use to manage depression, anxiety, low energy, poor appetite.  3. Will develop an aftercare / transition plan by increased engagement   Area of Treatment Focus:  Symptom Management, Personal Safety, Community Resources/Discharge Planning, Abstinence/Relapse Prevention, Develop / Improve Independent Living Skills and Develop Socialization / Interpersonal Relationship Skills     Therapeutic Interventions/Treatment Strategies:  Support, Feedback, Structured Activity, Problem Solving, Clarification, Education, Motivational Enhancement Therapy and Cognitive Behavioral Therapy     Response to Treatment Strategies:  Accepted Feedback, Gave Feedback, Listened, Focused on Goals, Attentive, Accepted Support, Alert and Demonstrates Behavior Change     Name of Group: Psychotherapy Group       Description and Outcome:    Hour 1: Hussein has decided that Wed will be her last day. She reported she feels less depressed and less anxious and plans to return to individual therapy and has other medical appointments. She also arranged the meeting with Antonia from the AL for this Thursday and is hoping that they can provide some services ala chandler. She expressed concern about her spouse as he fell in the parking lot last week. Overall she is brighter, much more able to engage in self care and problem solving and at baseline.  Hour 2: Several members shared importance of having meaning.Hussein reports that her family is very meaningful to her and that she continues to have " daily enjoyment and speaks to her sons almost daily. She has gained some weight back, looks healthier, and is reaching out to others where she lives.    Is this a Weekly Review of the Progress on the Treatment Plan?  Yes.      Are Treatment Plan Goals being addressed?  Yes, continue treatment goals        Are Treatment Plan Strategies to Address Goals Effective?  Yes, continue treatment strategies        Are there any current contracts in place?  Yes. safety No suicidal ideation endorsed.

## 2017-05-17 ENCOUNTER — HOSPITAL ENCOUNTER (OUTPATIENT)
Dept: BEHAVIORAL HEALTH | Facility: CLINIC | Age: 82
End: 2017-05-17
Attending: NURSE PRACTITIONER
Payer: MEDICARE

## 2017-05-17 PROCEDURE — H2012 BEHAV HLTH DAY TREAT, PER HR: HCPCS

## 2017-05-17 NOTE — PROGRESS NOTES
Group Therapy Progress Notes     Area of Treatment Focus:  Symptom Management, Develop / Improve Independent Living Skills, Develop Socialization / Interpersonal Relationship Skills and Cultural / Spirituality    Therapeutic Interventions/Treatment Strategies:  Support, Redirection, Feedback, Structured Activity, Clarification, Education, Motivational Enhancement Therapy and Practice    Response to Treatment Strategies:  Accepted Feedback, Gave Feedback, Listened, Focused on Goals, Attentive, Accepted Support, Alert and Demonstrates Behavior Change    Name of Group:  Mental Health Management    Progress Note  Self Compassion: read and discussed handout on this subject. Each client wrote out how they will apply it to their lives, using a few written questions to get them started. Ended with practicing Progressive Relaxation. Luna did not participate in discussion but did do some writing for herself to integrate into her life and seemed to relax with the practice of progressive relaxation.    Is this a Weekly Review of the Progress on the Treatment Plan?  No

## 2017-05-17 NOTE — PROGRESS NOTES
Adult Mental Health Intensive Outpatient Discharge Summary/Instructions      Patient: Luna aHssan MRN: [id number]   : 1932 Age: 84 year old Sex: female     Admission Date: 17  Discharge Date: 17  Diagnosis: Major Depressive Disorder    Focus of Treatment / Progress    Personal Safety: Hussein reports that she does not have suicidal thinking. She has hope and identifies positive factors as her family.     * Follow your safety plan     * Call crisis lines as needed:    [facility] [phone] [facility] [phone]  [facility] [phone] Crisis Connection [phone]  [facility] [phone] [facility] [phone]  [facility] [phone] National Suicide Prevention [phone]  [facility] [phone] Suicide Prevention [phone]  [facility] [phone]    Managing symptoms of:  Anxiety and Depression    Community support/health:  Hussein has an individual therapist at [facility], Flakito Plata. She plans to see her on a regular basis.    Managing Symptoms and Preventing Relapse    * Go to all of your appointments    * Take all medications as directed.      * Carry a current list if medication with you    * Do not use illicit (street) drugs.  Avoid alcohol    * Report these symptoms to your care team. These are early signs of relapse:   Thoughts of suicide   Losing more sleep   Increased confusion   Mood getting worse   Feeling more aggressive   Other:  Difficulty with problem solving and daily functioning    *Use these skills daily:  Continue to reach out to neighbors, participate when you can, continue walking halls and keeping your weight up. Other skills: relaxation strategies, affirmations, prayer and meditation, exercise.    Copy of summary sent to:     Follow up with psychiatrist / main caregiver: Mesha Crowe    Next visit: Hussein needs to schedule next appointment    Follow up with your therapist: Flakito Plata  Mesha  Next visit: Hussein will schedule when her schedule opens up, she has medical appointments in next several weeks.    Go to group therapy and / or support groups at: [facility]    See your medical doctor about:  Your ongoing cardiac issues and other medical concerns.    Other:  Hussein it has been a pleasure to work with you. You have worked hard and accomplished your goals of feeling better in mood and making hard decisions such as; arranging your ride through metro mobility and sticking with it; ordering new clothes that fit you; maintaining your weight; sharing honestly in group; and organizing a meeting with the director of the assisted living at your residence. Good luck to you.    Client Signature:_______________________  Date / Time:___________  Staff Signature:___DAISHA Henry_____________________   Date / Time:__5.17.17_________

## 2017-05-17 NOTE — PROGRESS NOTES
Adult Mental Health Intensive Outpatient Discharge Summary/Instructions      Patient: Luna Hassan MRN: [id number]   : 1932 Age: 84 year old Sex: female     Admission Date: 17  Discharge Date: 17  Diagnosis: Major Depressive Disorder    Focus of Treatment / Progress    Personal Safety: Hussein reports that she does not have suicidal thinking. She has hope and identifies positive factors as her family.     * Follow your safety plan     * Call crisis lines as needed:    [facility] [phone] [facility] [phone]  [facility] [phone] Crisis Connection [phone]  [facility] [phone] [facility] [phone]  [facility] [phone] National Suicide Prevention [phone]  [facility] [phone] Suicide Prevention [phone]  [facility] [phone]    Managing symptoms of:  Anxiety and Depression    Community support/health:  Hussein has an individual therapist at [facility], Flakito Plata. She plans to see her on a regular basis.    Managing Symptoms and Preventing Relapse    * Go to all of your appointments    * Take all medications as directed.      * Carry a current list if medication with you    * Do not use illicit (street) drugs.  Avoid alcohol    * Report these symptoms to your care team. These are early signs of relapse:   Thoughts of suicide   Losing more sleep   Increased confusion   Mood getting worse   Feeling more aggressive   Other:  Difficulty with problem solving and daily functioning    *Use these skills daily:  Continue to reach out to neighbors, participate when you can, continue walking halls and keeping your weight up. Other skills: relaxation strategies, affirmations, prayer and meditation, exercise.    Copy of summary sent to:     Follow up with psychiatrist / main caregiver: Mesha Crowe, [address], [place] [address]   [phone]      Next visit: Hussein needs to  schedule next appointment    Follow up with your therapist: Mesha Buchanan, [address] [place] [address] [address], [place] [address]   [phone]    Next visit: Hussein will schedule when her schedule opens up, she has medical appointments in next several weeks.    Go to group therapy and / or support groups at: [facility]    See your medical doctor about:  Your ongoing cardiac issues and other medical concerns.    Other:  Hussein it has been a pleasure to work with you. You have worked hard and accomplished your goals of feeling better in mood and making hard decisions such as; arranging your ride through [employer] mobility and sticking with it; ordering new clothes that fit you; maintaining your weight; sharing honestly in group; and organizing a meeting with the director of the assisted living at your residence. Good luck to you.    Client Signature:_______________________  Date / Time:___________  Staff Signature:___DAISHA Henry_____________________   Date / Time:__5.17.17_________

## 2017-05-17 NOTE — PROGRESS NOTES
"  Adult Mental Health Outpatient Group Therapy Progress Note     Client Initial Individualized Goals for Treatment: \" to get comfortable in my skin, start to have more energy to care for self\".         See Initial Treatment suggestions for the client during the time between Diagnostic Assessment and completion of the Master Individualized Treatment Plan.     Treatment Goals:   1. Client will use coping plan for safety, as needed.  2. Will report on symptoms and identify skills to use to manage depression, anxiety, low energy, poor appetite.  3. Will develop an aftercare / transition plan by increased engagement   Area of Treatment Focus:  Symptom Management, Personal Safety, Community Resources/Discharge Planning, Abstinence/Relapse Prevention, Develop / Improve Independent Living Skills and Develop Socialization / Interpersonal Relationship Skills     Therapeutic Interventions/Treatment Strategies:  Support, Feedback, Structured Activity, Problem Solving, Clarification, Education, Motivational Enhancement Therapy and Cognitive Behavioral Therapy     Response to Treatment Strategies:  Accepted Feedback, Gave Feedback, Listened, Focused on Goals, Attentive, Accepted Support, Alert and Demonstrates Behavior Change     Name of Group: Psychotherapy Group     Description and Outcome:  Hour 1:  Hussein is discharging today. She reports improved mood and feels she benefited from programming. She will return to seeing her therapist and has medical appointments in next several weeks as well as AL appointment tomorrow.    Hour 2: Discussed as a group the issues with aging and needing to make changes that are difficult but that ultimately help improve quality of life, examples: Hussein is accepting she may need AL level of support.    Is this a Weekly Review of the Progress on the Treatment Plan?  Yes.      Are Treatment Plan Goals being addressed?  Yes, continue treatment goals        Are Treatment Plan Strategies to Address Goals " Effective?  Yes, continue treatment strategies        Are there any current contracts in place?  Yes. safety No suicidal ideation endorsed.

## 2017-06-14 ENCOUNTER — RECORDS - HEALTHEAST (OUTPATIENT)
Dept: LAB | Facility: CLINIC | Age: 82
End: 2017-06-14

## 2017-06-14 LAB
CHOLEST SERPL-MCNC: 140 MG/DL
FASTING STATUS PATIENT QL REPORTED: YES
HBA1C MFR BLD: 5.8 % (ref 4.2–6.1)
HDLC SERPL-MCNC: 60 MG/DL
LDLC SERPL CALC-MCNC: 64 MG/DL
TRIGL SERPL-MCNC: 79 MG/DL

## 2018-03-05 ENCOUNTER — RECORDS - HEALTHEAST (OUTPATIENT)
Dept: LAB | Facility: CLINIC | Age: 83
End: 2018-03-05

## 2018-03-05 LAB
ALBUMIN SERPL-MCNC: 3.2 G/DL (ref 3.5–5)
ALP SERPL-CCNC: 46 U/L (ref 45–120)
ALT SERPL W P-5'-P-CCNC: 18 U/L (ref 0–45)
ANION GAP SERPL CALCULATED.3IONS-SCNC: 9 MMOL/L (ref 5–18)
AST SERPL W P-5'-P-CCNC: 24 U/L (ref 0–40)
BILIRUB DIRECT SERPL-MCNC: 0.2 MG/DL
BILIRUB SERPL-MCNC: 0.6 MG/DL (ref 0–1)
BUN SERPL-MCNC: 24 MG/DL (ref 8–28)
CALCIUM SERPL-MCNC: 9 MG/DL (ref 8.5–10.5)
CHLORIDE BLD-SCNC: 105 MMOL/L (ref 98–107)
CO2 SERPL-SCNC: 26 MMOL/L (ref 22–31)
CREAT SERPL-MCNC: 0.7 MG/DL (ref 0.6–1.1)
GFR SERPL CREATININE-BSD FRML MDRD: >60 ML/MIN/1.73M2
GLUCOSE BLD-MCNC: 82 MG/DL (ref 70–125)
MAGNESIUM SERPL-MCNC: 1.8 MG/DL (ref 1.8–2.6)
POTASSIUM BLD-SCNC: 4.1 MMOL/L (ref 3.5–5)
PROT SERPL-MCNC: 6.1 G/DL (ref 6–8)
SODIUM SERPL-SCNC: 140 MMOL/L (ref 136–145)
VIT B12 SERPL-MCNC: 309 PG/ML (ref 213–816)

## 2018-04-27 ENCOUNTER — RECORDS - HEALTHEAST (OUTPATIENT)
Dept: LAB | Facility: CLINIC | Age: 83
End: 2018-04-27

## 2018-04-27 LAB
ANION GAP SERPL CALCULATED.3IONS-SCNC: 10 MMOL/L (ref 5–18)
BUN SERPL-MCNC: 16 MG/DL (ref 8–28)
CALCIUM SERPL-MCNC: 9.3 MG/DL (ref 8.5–10.5)
CHLORIDE BLD-SCNC: 110 MMOL/L (ref 98–107)
CO2 SERPL-SCNC: 21 MMOL/L (ref 22–31)
CREAT SERPL-MCNC: 0.71 MG/DL (ref 0.6–1.1)
GFR SERPL CREATININE-BSD FRML MDRD: >60 ML/MIN/1.73M2
GLUCOSE BLD-MCNC: 77 MG/DL (ref 70–125)
POTASSIUM BLD-SCNC: 4.6 MMOL/L (ref 3.5–5)
SODIUM SERPL-SCNC: 141 MMOL/L (ref 136–145)

## 2018-07-17 ENCOUNTER — RECORDS - HEALTHEAST (OUTPATIENT)
Dept: LAB | Facility: CLINIC | Age: 83
End: 2018-07-17

## 2018-07-18 LAB
ANION GAP SERPL CALCULATED.3IONS-SCNC: 6 MMOL/L (ref 5–18)
BUN SERPL-MCNC: 21 MG/DL (ref 8–28)
CALCIUM SERPL-MCNC: 8.9 MG/DL (ref 8.5–10.5)
CHLORIDE BLD-SCNC: 107 MMOL/L (ref 98–107)
CO2 SERPL-SCNC: 30 MMOL/L (ref 22–31)
CREAT SERPL-MCNC: 0.76 MG/DL (ref 0.6–1.1)
ERYTHROCYTE [DISTWIDTH] IN BLOOD BY AUTOMATED COUNT: 13.7 % (ref 11–14.5)
GFR SERPL CREATININE-BSD FRML MDRD: >60 ML/MIN/1.73M2
GLUCOSE BLD-MCNC: 81 MG/DL (ref 70–125)
HCT VFR BLD AUTO: 35.3 % (ref 35–47)
HGB BLD-MCNC: 11.3 G/DL (ref 12–16)
MCH RBC QN AUTO: 30.6 PG (ref 27–34)
MCHC RBC AUTO-ENTMCNC: 32 G/DL (ref 32–36)
MCV RBC AUTO: 96 FL (ref 80–100)
PLATELET # BLD AUTO: 182 THOU/UL (ref 140–440)
PMV BLD AUTO: 11 FL (ref 8.5–12.5)
POTASSIUM BLD-SCNC: 4 MMOL/L (ref 3.5–5)
RBC # BLD AUTO: 3.69 MILL/UL (ref 3.8–5.4)
SODIUM SERPL-SCNC: 143 MMOL/L (ref 136–145)
WBC: 4.8 THOU/UL (ref 4–11)

## 2018-09-19 ENCOUNTER — RECORDS - HEALTHEAST (OUTPATIENT)
Dept: LAB | Facility: CLINIC | Age: 83
End: 2018-09-19

## 2018-09-19 LAB
ANION GAP SERPL CALCULATED.3IONS-SCNC: 9 MMOL/L (ref 5–18)
BUN SERPL-MCNC: 20 MG/DL (ref 8–28)
CALCIUM SERPL-MCNC: 9.7 MG/DL (ref 8.5–10.5)
CHLORIDE BLD-SCNC: 107 MMOL/L (ref 98–107)
CO2 SERPL-SCNC: 26 MMOL/L (ref 22–31)
CREAT SERPL-MCNC: 0.72 MG/DL (ref 0.6–1.1)
ERYTHROCYTE [DISTWIDTH] IN BLOOD BY AUTOMATED COUNT: 13.8 % (ref 11–14.5)
GFR SERPL CREATININE-BSD FRML MDRD: >60 ML/MIN/1.73M2
GLUCOSE BLD-MCNC: 111 MG/DL (ref 70–125)
HCT VFR BLD AUTO: 41.8 % (ref 35–47)
HGB BLD-MCNC: 13.7 G/DL (ref 12–16)
MCH RBC QN AUTO: 30.7 PG (ref 27–34)
MCHC RBC AUTO-ENTMCNC: 32.8 G/DL (ref 32–36)
MCV RBC AUTO: 94 FL (ref 80–100)
PLATELET # BLD AUTO: 234 THOU/UL (ref 140–440)
PMV BLD AUTO: 10.6 FL (ref 8.5–12.5)
POTASSIUM BLD-SCNC: 4.4 MMOL/L (ref 3.5–5)
RBC # BLD AUTO: 4.46 MILL/UL (ref 3.8–5.4)
SODIUM SERPL-SCNC: 142 MMOL/L (ref 136–145)
WBC: 5.2 THOU/UL (ref 4–11)

## 2019-03-18 NOTE — PROGRESS NOTES
"  Adult Mental Health Outpatient Group Therapy Progress Note     Client Initial Individualized Goals for Treatment: \" to get comfortable in my skin, start to have more energy to care for self\".         See Initial Treatment suggestions for the client during the time between Diagnostic Assessment and completion of the Master Individualized Treatment Plan.     Treatment Goals:   1. Client will use coping plan for safety, as needed.  2. Will report on symptoms and identify skills to use to manage depression, anxiety, low energy, poor appetite.  3. Will develop an aftercare / transition plan by increased engagement   Area of Treatment Focus:  Symptom Management, Personal Safety, Community Resources/Discharge Planning, Abstinence/Relapse Prevention, Develop / Improve Independent Living Skills and Develop Socialization / Interpersonal Relationship Skills     Therapeutic Interventions/Treatment Strategies:  Support, Feedback, Structured Activity, Problem Solving, Clarification, Education, Motivational Enhancement Therapy and Cognitive Behavioral Therapy     Response to Treatment Strategies:  Accepted Feedback, Gave Feedback, Listened, Focused on Goals, Attentive, Accepted Support, Alert and Demonstrates Behavior Change     Name of Group: Psychotherapy Group     Description and Outcome:  Hour 1: Writer met with Hussein at 11AM to review tx plan. Themes were repeated in group including: she is starting to think that she may need an AL but feels her  would not be agreeable. He helps her get dressed every day as she is unable to do this on her own. She agreed to speak to both her sons and  about her thoughts. In order to get more info about AL she was informed that a family member other than her  would need to join her at the information session. She will move forward with plans to talk to them about her thoughts. She continues to endorse high anxiety in the morning particularly around not having clothing that " Addended by: ESPERANZA WANG on: 3/18/2019 05:36 PM     Modules accepted: Orders     fits her. She still stumbles with how to problem solve this but with support she has ordered some items on line. She continues to walk in the halls for exercise and is eating as much as she can to keep her weight up. Overall she reports improvement in her mood and would like to continue for another month to work on goal around decisions about AL and family communication, and ongoing resilience.  Hour 2: As a group we discussed resilience. Everyone had an example of how they are maintaining or developing resilience. Hussein identified she is working on various fronts including: thinking about her living situation, her needs, managing her physical health, and mental health.        Is this a Weekly Review of the Progress on the Treatment Plan?  Yes.      Are Treatment Plan Goals being addressed?  Yes, continue treatment goals        Are Treatment Plan Strategies to Address Goals Effective?  Yes, continue treatment strategies        Are there any current contracts in place?  Yes. safety No suicidal ideation endorsed.

## 2019-05-13 ENCOUNTER — RECORDS - HEALTHEAST (OUTPATIENT)
Dept: LAB | Facility: CLINIC | Age: 84
End: 2019-05-13

## 2019-05-13 LAB
ANION GAP SERPL CALCULATED.3IONS-SCNC: 8 MMOL/L (ref 5–18)
BASOPHILS # BLD AUTO: 0.1 THOU/UL (ref 0–0.2)
BASOPHILS NFR BLD AUTO: 1 % (ref 0–2)
BUN SERPL-MCNC: 20 MG/DL (ref 8–28)
CALCIUM SERPL-MCNC: 9.4 MG/DL (ref 8.5–10.5)
CHLORIDE BLD-SCNC: 106 MMOL/L (ref 98–107)
CO2 SERPL-SCNC: 26 MMOL/L (ref 22–31)
CREAT SERPL-MCNC: 0.75 MG/DL (ref 0.6–1.1)
EOSINOPHIL # BLD AUTO: 0.3 THOU/UL (ref 0–0.4)
EOSINOPHIL NFR BLD AUTO: 4 % (ref 0–6)
ERYTHROCYTE [DISTWIDTH] IN BLOOD BY AUTOMATED COUNT: 13.5 % (ref 11–14.5)
GFR SERPL CREATININE-BSD FRML MDRD: >60 ML/MIN/1.73M2
GLUCOSE BLD-MCNC: 95 MG/DL (ref 70–125)
HCT VFR BLD AUTO: 39 % (ref 35–47)
HGB BLD-MCNC: 12.5 G/DL (ref 12–16)
LYMPHOCYTES # BLD AUTO: 2.1 THOU/UL (ref 0.8–4.4)
LYMPHOCYTES NFR BLD AUTO: 28 % (ref 20–40)
MCH RBC QN AUTO: 30.3 PG (ref 27–34)
MCHC RBC AUTO-ENTMCNC: 32.1 G/DL (ref 32–36)
MCV RBC AUTO: 94 FL (ref 80–100)
MONOCYTES # BLD AUTO: 0.7 THOU/UL (ref 0–0.9)
MONOCYTES NFR BLD AUTO: 9 % (ref 2–10)
NEUTROPHILS # BLD AUTO: 4.2 THOU/UL (ref 2–7.7)
NEUTROPHILS NFR BLD AUTO: 58 % (ref 50–70)
PLATELET # BLD AUTO: 333 THOU/UL (ref 140–440)
PMV BLD AUTO: 10.2 FL (ref 8.5–12.5)
POTASSIUM BLD-SCNC: 4.2 MMOL/L (ref 3.5–5)
RBC # BLD AUTO: 4.13 MILL/UL (ref 3.8–5.4)
SODIUM SERPL-SCNC: 140 MMOL/L (ref 136–145)
WBC: 7.4 THOU/UL (ref 4–11)

## 2019-07-31 ENCOUNTER — RECORDS - HEALTHEAST (OUTPATIENT)
Dept: LAB | Facility: CLINIC | Age: 84
End: 2019-07-31

## 2019-07-31 LAB
ANION GAP SERPL CALCULATED.3IONS-SCNC: 7 MMOL/L (ref 5–18)
BASOPHILS # BLD AUTO: 0 THOU/UL (ref 0–0.2)
BASOPHILS NFR BLD AUTO: 1 % (ref 0–2)
BUN SERPL-MCNC: 19 MG/DL (ref 8–28)
CALCIUM SERPL-MCNC: 9.1 MG/DL (ref 8.5–10.5)
CHLORIDE BLD-SCNC: 108 MMOL/L (ref 98–107)
CO2 SERPL-SCNC: 27 MMOL/L (ref 22–31)
CREAT SERPL-MCNC: 0.69 MG/DL (ref 0.6–1.1)
EOSINOPHIL # BLD AUTO: 0.2 THOU/UL (ref 0–0.4)
EOSINOPHIL NFR BLD AUTO: 4 % (ref 0–6)
ERYTHROCYTE [DISTWIDTH] IN BLOOD BY AUTOMATED COUNT: 14.4 % (ref 11–14.5)
GFR SERPL CREATININE-BSD FRML MDRD: >60 ML/MIN/1.73M2
GLUCOSE BLD-MCNC: 84 MG/DL (ref 70–125)
HCT VFR BLD AUTO: 35.1 % (ref 35–47)
HGB BLD-MCNC: 11.2 G/DL (ref 12–16)
LYMPHOCYTES # BLD AUTO: 1.6 THOU/UL (ref 0.8–4.4)
LYMPHOCYTES NFR BLD AUTO: 31 % (ref 20–40)
MCH RBC QN AUTO: 30.1 PG (ref 27–34)
MCHC RBC AUTO-ENTMCNC: 31.9 G/DL (ref 32–36)
MCV RBC AUTO: 94 FL (ref 80–100)
MONOCYTES # BLD AUTO: 0.4 THOU/UL (ref 0–0.9)
MONOCYTES NFR BLD AUTO: 8 % (ref 2–10)
NEUTROPHILS # BLD AUTO: 2.9 THOU/UL (ref 2–7.7)
NEUTROPHILS NFR BLD AUTO: 57 % (ref 50–70)
PLATELET # BLD AUTO: 199 THOU/UL (ref 140–440)
PMV BLD AUTO: 10.8 FL (ref 8.5–12.5)
POTASSIUM BLD-SCNC: 4 MMOL/L (ref 3.5–5)
RBC # BLD AUTO: 3.72 MILL/UL (ref 3.8–5.4)
SODIUM SERPL-SCNC: 142 MMOL/L (ref 136–145)
TSH SERPL DL<=0.005 MIU/L-ACNC: 1.19 UIU/ML (ref 0.3–5)
WBC: 5.1 THOU/UL (ref 4–11)

## 2019-12-30 ENCOUNTER — RECORDS - HEALTHEAST (OUTPATIENT)
Dept: LAB | Facility: CLINIC | Age: 84
End: 2019-12-30

## 2019-12-30 LAB
25(OH)D3 SERPL-MCNC: 42.9 NG/ML (ref 30–80)
ALBUMIN SERPL-MCNC: 3.5 G/DL (ref 3.5–5)
ALP SERPL-CCNC: 66 U/L (ref 45–120)
ALT SERPL W P-5'-P-CCNC: 11 U/L (ref 0–45)
ANION GAP SERPL CALCULATED.3IONS-SCNC: 8 MMOL/L (ref 5–18)
AST SERPL W P-5'-P-CCNC: 19 U/L (ref 0–40)
BASOPHILS # BLD AUTO: 0 THOU/UL (ref 0–0.2)
BASOPHILS NFR BLD AUTO: 1 % (ref 0–2)
BILIRUB DIRECT SERPL-MCNC: 0.3 MG/DL
BILIRUB SERPL-MCNC: 0.7 MG/DL (ref 0–1)
BUN SERPL-MCNC: 14 MG/DL (ref 8–28)
CALCIUM SERPL-MCNC: 9.1 MG/DL (ref 8.5–10.5)
CHLORIDE BLD-SCNC: 104 MMOL/L (ref 98–107)
CO2 SERPL-SCNC: 25 MMOL/L (ref 22–31)
CREAT SERPL-MCNC: 0.7 MG/DL (ref 0.6–1.1)
EOSINOPHIL # BLD AUTO: 0.2 THOU/UL (ref 0–0.4)
EOSINOPHIL NFR BLD AUTO: 4 % (ref 0–6)
ERYTHROCYTE [DISTWIDTH] IN BLOOD BY AUTOMATED COUNT: 13.8 % (ref 11–14.5)
GFR SERPL CREATININE-BSD FRML MDRD: >60 ML/MIN/1.73M2
GLUCOSE BLD-MCNC: 87 MG/DL (ref 70–125)
HCT VFR BLD AUTO: 39.1 % (ref 35–47)
HGB BLD-MCNC: 12.5 G/DL (ref 12–16)
LYMPHOCYTES # BLD AUTO: 1.1 THOU/UL (ref 0.8–4.4)
LYMPHOCYTES NFR BLD AUTO: 20 % (ref 20–40)
MCH RBC QN AUTO: 30 PG (ref 27–34)
MCHC RBC AUTO-ENTMCNC: 32 G/DL (ref 32–36)
MCV RBC AUTO: 94 FL (ref 80–100)
MONOCYTES # BLD AUTO: 0.5 THOU/UL (ref 0–0.9)
MONOCYTES NFR BLD AUTO: 9 % (ref 2–10)
NEUTROPHILS # BLD AUTO: 3.7 THOU/UL (ref 2–7.7)
NEUTROPHILS NFR BLD AUTO: 66 % (ref 50–70)
PLATELET # BLD AUTO: 217 THOU/UL (ref 140–440)
PMV BLD AUTO: 11.1 FL (ref 8.5–12.5)
POTASSIUM BLD-SCNC: 4.3 MMOL/L (ref 3.5–5)
PROT SERPL-MCNC: 6.6 G/DL (ref 6–8)
RBC # BLD AUTO: 4.17 MILL/UL (ref 3.8–5.4)
SODIUM SERPL-SCNC: 137 MMOL/L (ref 136–145)
TSH SERPL DL<=0.005 MIU/L-ACNC: 1.25 UIU/ML (ref 0.3–5)
WBC: 5.6 THOU/UL (ref 4–11)

## 2020-01-17 ENCOUNTER — RECORDS - HEALTHEAST (OUTPATIENT)
Dept: LAB | Facility: CLINIC | Age: 85
End: 2020-01-17

## 2020-01-17 LAB
ALBUMIN UR-MCNC: ABNORMAL MG/DL
ANION GAP SERPL CALCULATED.3IONS-SCNC: 7 MMOL/L (ref 5–18)
APPEARANCE UR: ABNORMAL
BACTERIA #/AREA URNS HPF: ABNORMAL HPF
BASOPHILS # BLD AUTO: 0 THOU/UL (ref 0–0.2)
BASOPHILS NFR BLD AUTO: 0 % (ref 0–2)
BILIRUB UR QL STRIP: NEGATIVE
BNP SERPL-MCNC: 930 PG/ML (ref 0–167)
BUN SERPL-MCNC: 16 MG/DL (ref 8–28)
CALCIUM SERPL-MCNC: 8.4 MG/DL (ref 8.5–10.5)
CHLORIDE BLD-SCNC: 106 MMOL/L (ref 98–107)
CO2 SERPL-SCNC: 25 MMOL/L (ref 22–31)
COLOR UR AUTO: YELLOW
CREAT SERPL-MCNC: 0.67 MG/DL (ref 0.6–1.1)
EOSINOPHIL # BLD AUTO: 0.1 THOU/UL (ref 0–0.4)
EOSINOPHIL NFR BLD AUTO: 4 % (ref 0–6)
ERYTHROCYTE [DISTWIDTH] IN BLOOD BY AUTOMATED COUNT: 13.6 % (ref 11–14.5)
GFR SERPL CREATININE-BSD FRML MDRD: >60 ML/MIN/1.73M2
GLUCOSE BLD-MCNC: 89 MG/DL (ref 70–125)
GLUCOSE UR STRIP-MCNC: NEGATIVE MG/DL
HCT VFR BLD AUTO: 33.1 % (ref 35–47)
HGB BLD-MCNC: 10.8 G/DL (ref 12–16)
HGB UR QL STRIP: NEGATIVE
KETONES UR STRIP-MCNC: NEGATIVE MG/DL
LEUKOCYTE ESTERASE UR QL STRIP: NEGATIVE
LYMPHOCYTES # BLD AUTO: 1.4 THOU/UL (ref 0.8–4.4)
LYMPHOCYTES NFR BLD AUTO: 37 % (ref 20–40)
MCH RBC QN AUTO: 29.2 PG (ref 27–34)
MCHC RBC AUTO-ENTMCNC: 32.6 G/DL (ref 32–36)
MCV RBC AUTO: 90 FL (ref 80–100)
MONOCYTES # BLD AUTO: 0.4 THOU/UL (ref 0–0.9)
MONOCYTES NFR BLD AUTO: 10 % (ref 2–10)
MUCOUS THREADS #/AREA URNS LPF: ABNORMAL LPF
NEUTROPHILS # BLD AUTO: 1.8 THOU/UL (ref 2–7.7)
NEUTROPHILS NFR BLD AUTO: 49 % (ref 50–70)
NITRATE UR QL: NEGATIVE
PH UR STRIP: 5.5 [PH] (ref 4.5–8)
PLATELET # BLD AUTO: 232 THOU/UL (ref 140–440)
PMV BLD AUTO: 10.8 FL (ref 8.5–12.5)
POTASSIUM BLD-SCNC: 3.8 MMOL/L (ref 3.5–5)
RBC # BLD AUTO: 3.7 MILL/UL (ref 3.8–5.4)
RBC #/AREA URNS AUTO: ABNORMAL HPF
SODIUM SERPL-SCNC: 138 MMOL/L (ref 136–145)
SP GR UR STRIP: 1.02 (ref 1–1.03)
SQUAMOUS #/AREA URNS AUTO: ABNORMAL LPF
UROBILINOGEN UR STRIP-ACNC: ABNORMAL
VIT B12 SERPL-MCNC: 445 PG/ML (ref 213–816)
WBC #/AREA URNS AUTO: ABNORMAL HPF
WBC: 3.7 THOU/UL (ref 4–11)

## 2020-01-18 LAB — BACTERIA SPEC CULT: NO GROWTH

## 2020-01-20 LAB — G LAMBLIA AG STL QL IA: NORMAL

## 2020-01-23 ENCOUNTER — RECORDS - HEALTHEAST (OUTPATIENT)
Dept: LAB | Facility: CLINIC | Age: 85
End: 2020-01-23

## 2020-01-24 LAB
ANION GAP SERPL CALCULATED.3IONS-SCNC: 8 MMOL/L (ref 5–18)
BNP SERPL-MCNC: 450 PG/ML (ref 0–167)
BUN SERPL-MCNC: 14 MG/DL (ref 8–28)
CALCIUM SERPL-MCNC: 8.5 MG/DL (ref 8.5–10.5)
CHLORIDE BLD-SCNC: 104 MMOL/L (ref 98–107)
CO2 SERPL-SCNC: 28 MMOL/L (ref 22–31)
CREAT SERPL-MCNC: 0.68 MG/DL (ref 0.6–1.1)
FERRITIN SERPL-MCNC: 78 NG/ML (ref 10–130)
GFR SERPL CREATININE-BSD FRML MDRD: >60 ML/MIN/1.73M2
GLUCOSE BLD-MCNC: 82 MG/DL (ref 70–125)
HGB BLD-MCNC: 11 G/DL (ref 12–16)
IRON SATN MFR SERPL: 22 % (ref 20–50)
IRON SERPL-MCNC: 51 UG/DL (ref 42–175)
POTASSIUM BLD-SCNC: 3.5 MMOL/L (ref 3.5–5)
SODIUM SERPL-SCNC: 140 MMOL/L (ref 136–145)
TIBC SERPL-MCNC: 232 UG/DL (ref 313–563)
TRANSFERRIN SERPL-MCNC: 186 MG/DL (ref 212–360)

## 2020-01-27 ENCOUNTER — RECORDS - HEALTHEAST (OUTPATIENT)
Dept: LAB | Facility: CLINIC | Age: 85
End: 2020-01-27

## 2020-01-27 LAB — HEMOCCULT STL QL: NEGATIVE

## 2020-01-29 ENCOUNTER — RECORDS - HEALTHEAST (OUTPATIENT)
Dept: LAB | Facility: CLINIC | Age: 85
End: 2020-01-29

## 2020-01-29 LAB
ANION GAP SERPL CALCULATED.3IONS-SCNC: 7 MMOL/L (ref 5–18)
BUN SERPL-MCNC: 13 MG/DL (ref 8–28)
CALCIUM SERPL-MCNC: 8.3 MG/DL (ref 8.5–10.5)
CHLORIDE BLD-SCNC: 106 MMOL/L (ref 98–107)
CO2 SERPL-SCNC: 27 MMOL/L (ref 22–31)
CREAT SERPL-MCNC: 0.7 MG/DL (ref 0.6–1.1)
GFR SERPL CREATININE-BSD FRML MDRD: >60 ML/MIN/1.73M2
GLUCOSE BLD-MCNC: 83 MG/DL (ref 70–125)
POTASSIUM BLD-SCNC: 4.3 MMOL/L (ref 3.5–5)
SODIUM SERPL-SCNC: 140 MMOL/L (ref 136–145)

## 2020-02-03 ENCOUNTER — RECORDS - HEALTHEAST (OUTPATIENT)
Dept: LAB | Facility: CLINIC | Age: 85
End: 2020-02-03

## 2020-02-03 LAB
ANION GAP SERPL CALCULATED.3IONS-SCNC: 8 MMOL/L (ref 5–18)
BNP SERPL-MCNC: 793 PG/ML (ref 0–167)
BUN SERPL-MCNC: 16 MG/DL (ref 8–28)
CALCIUM SERPL-MCNC: 8.8 MG/DL (ref 8.5–10.5)
CHLORIDE BLD-SCNC: 105 MMOL/L (ref 98–107)
CO2 SERPL-SCNC: 24 MMOL/L (ref 22–31)
CREAT SERPL-MCNC: 0.73 MG/DL (ref 0.6–1.1)
GFR SERPL CREATININE-BSD FRML MDRD: >60 ML/MIN/1.73M2
GLUCOSE BLD-MCNC: 92 MG/DL (ref 70–125)
POTASSIUM BLD-SCNC: 4.3 MMOL/L (ref 3.5–5)
SODIUM SERPL-SCNC: 137 MMOL/L (ref 136–145)

## 2020-02-11 ENCOUNTER — RECORDS - HEALTHEAST (OUTPATIENT)
Dept: LAB | Facility: CLINIC | Age: 85
End: 2020-02-11

## 2020-02-11 LAB
ANION GAP SERPL CALCULATED.3IONS-SCNC: 10 MMOL/L (ref 5–18)
BUN SERPL-MCNC: 14 MG/DL (ref 8–28)
CALCIUM SERPL-MCNC: 8.6 MG/DL (ref 8.5–10.5)
CHLORIDE BLD-SCNC: 103 MMOL/L (ref 98–107)
CO2 SERPL-SCNC: 26 MMOL/L (ref 22–31)
CREAT SERPL-MCNC: 0.61 MG/DL (ref 0.6–1.1)
GFR SERPL CREATININE-BSD FRML MDRD: >60 ML/MIN/1.73M2
GLUCOSE BLD-MCNC: 87 MG/DL (ref 70–125)
POTASSIUM BLD-SCNC: 3.5 MMOL/L (ref 3.5–5)
SODIUM SERPL-SCNC: 139 MMOL/L (ref 136–145)

## 2020-02-17 ENCOUNTER — RECORDS - HEALTHEAST (OUTPATIENT)
Dept: LAB | Facility: CLINIC | Age: 85
End: 2020-02-17

## 2020-02-17 LAB
ANION GAP SERPL CALCULATED.3IONS-SCNC: 7 MMOL/L (ref 5–18)
BNP SERPL-MCNC: 847 PG/ML (ref 0–167)
BUN SERPL-MCNC: 9 MG/DL (ref 8–28)
CALCIUM SERPL-MCNC: 8.9 MG/DL (ref 8.5–10.5)
CHLORIDE BLD-SCNC: 104 MMOL/L (ref 98–107)
CO2 SERPL-SCNC: 29 MMOL/L (ref 22–31)
CREAT SERPL-MCNC: 0.68 MG/DL (ref 0.6–1.1)
GFR SERPL CREATININE-BSD FRML MDRD: >60 ML/MIN/1.73M2
GLUCOSE BLD-MCNC: 83 MG/DL (ref 70–125)
POTASSIUM BLD-SCNC: 4 MMOL/L (ref 3.5–5)
SODIUM SERPL-SCNC: 140 MMOL/L (ref 136–145)

## 2020-05-24 ENCOUNTER — RECORDS - HEALTHEAST (OUTPATIENT)
Dept: LAB | Facility: CLINIC | Age: 85
End: 2020-05-24

## 2020-05-24 LAB
ALBUMIN UR-MCNC: ABNORMAL MG/DL
APPEARANCE UR: CLEAR
BACTERIA #/AREA URNS HPF: ABNORMAL HPF
BILIRUB UR QL STRIP: NEGATIVE
COLOR UR AUTO: YELLOW
GLUCOSE UR STRIP-MCNC: NEGATIVE MG/DL
HGB UR QL STRIP: NEGATIVE
HYALINE CASTS #/AREA URNS LPF: ABNORMAL LPF
KETONES UR STRIP-MCNC: NEGATIVE MG/DL
LEUKOCYTE ESTERASE UR QL STRIP: NEGATIVE
MUCOUS THREADS #/AREA URNS LPF: ABNORMAL LPF
NITRATE UR QL: NEGATIVE
PH UR STRIP: 6 [PH] (ref 4.5–8)
RBC #/AREA URNS AUTO: ABNORMAL HPF
SP GR UR STRIP: 1.02 (ref 1–1.03)
SQUAMOUS #/AREA URNS AUTO: ABNORMAL LPF
URATE CRY #/AREA URNS HPF: PRESENT /[HPF]
UROBILINOGEN UR STRIP-ACNC: ABNORMAL
WBC #/AREA URNS AUTO: ABNORMAL HPF

## 2020-05-27 ENCOUNTER — RECORDS - HEALTHEAST (OUTPATIENT)
Dept: LAB | Facility: CLINIC | Age: 85
End: 2020-05-27

## 2020-05-27 LAB
ANION GAP SERPL CALCULATED.3IONS-SCNC: 9 MMOL/L (ref 5–18)
BNP SERPL-MCNC: 1149 PG/ML (ref 0–167)
BUN SERPL-MCNC: 17 MG/DL (ref 8–28)
CALCIUM SERPL-MCNC: 8.9 MG/DL (ref 8.5–10.5)
CHLORIDE BLD-SCNC: 105 MMOL/L (ref 98–107)
CO2 SERPL-SCNC: 25 MMOL/L (ref 22–31)
CREAT SERPL-MCNC: 0.75 MG/DL (ref 0.6–1.1)
GFR SERPL CREATININE-BSD FRML MDRD: >60 ML/MIN/1.73M2
GLUCOSE BLD-MCNC: 83 MG/DL (ref 70–125)
HBA1C MFR BLD: 5.5 %
POTASSIUM BLD-SCNC: 4 MMOL/L (ref 3.5–5)
SODIUM SERPL-SCNC: 139 MMOL/L (ref 136–145)

## 2020-06-04 ENCOUNTER — RECORDS - HEALTHEAST (OUTPATIENT)
Dept: LAB | Facility: CLINIC | Age: 85
End: 2020-06-04

## 2020-06-04 LAB
ANION GAP SERPL CALCULATED.3IONS-SCNC: 5 MMOL/L (ref 5–18)
BNP SERPL-MCNC: 916 PG/ML (ref 0–167)
BUN SERPL-MCNC: 17 MG/DL (ref 8–28)
CALCIUM SERPL-MCNC: 8.6 MG/DL (ref 8.5–10.5)
CHLORIDE BLD-SCNC: 107 MMOL/L (ref 98–107)
CO2 SERPL-SCNC: 28 MMOL/L (ref 22–31)
CREAT SERPL-MCNC: 0.65 MG/DL (ref 0.6–1.1)
GFR SERPL CREATININE-BSD FRML MDRD: >60 ML/MIN/1.73M2
GLUCOSE BLD-MCNC: 81 MG/DL (ref 70–125)
POTASSIUM BLD-SCNC: 3.7 MMOL/L (ref 3.5–5)
SODIUM SERPL-SCNC: 140 MMOL/L (ref 136–145)

## 2020-06-10 ENCOUNTER — RECORDS - HEALTHEAST (OUTPATIENT)
Dept: LAB | Facility: CLINIC | Age: 85
End: 2020-06-10

## 2020-06-10 LAB
ANION GAP SERPL CALCULATED.3IONS-SCNC: 8 MMOL/L (ref 5–18)
BUN SERPL-MCNC: 22 MG/DL (ref 8–28)
CALCIUM SERPL-MCNC: 8.9 MG/DL (ref 8.5–10.5)
CHLORIDE BLD-SCNC: 106 MMOL/L (ref 98–107)
CO2 SERPL-SCNC: 27 MMOL/L (ref 22–31)
CREAT SERPL-MCNC: 0.71 MG/DL (ref 0.6–1.1)
GFR SERPL CREATININE-BSD FRML MDRD: >60 ML/MIN/1.73M2
GLUCOSE BLD-MCNC: 85 MG/DL (ref 70–125)
POTASSIUM BLD-SCNC: 3.8 MMOL/L (ref 3.5–5)
SODIUM SERPL-SCNC: 141 MMOL/L (ref 136–145)

## 2020-07-16 ENCOUNTER — RECORDS - HEALTHEAST (OUTPATIENT)
Dept: LAB | Facility: CLINIC | Age: 85
End: 2020-07-16

## 2020-07-17 LAB
ANION GAP SERPL CALCULATED.3IONS-SCNC: 6 MMOL/L (ref 5–18)
BNP SERPL-MCNC: 1023 PG/ML (ref 0–167)
BUN SERPL-MCNC: 18 MG/DL (ref 8–28)
CALCIUM SERPL-MCNC: 8.8 MG/DL (ref 8.5–10.5)
CHLORIDE BLD-SCNC: 106 MMOL/L (ref 98–107)
CO2 SERPL-SCNC: 29 MMOL/L (ref 22–31)
CREAT SERPL-MCNC: 0.72 MG/DL (ref 0.6–1.1)
GFR SERPL CREATININE-BSD FRML MDRD: >60 ML/MIN/1.73M2
GLUCOSE BLD-MCNC: 84 MG/DL (ref 70–125)
POTASSIUM BLD-SCNC: 4 MMOL/L (ref 3.5–5)
SODIUM SERPL-SCNC: 141 MMOL/L (ref 136–145)

## 2020-07-23 ENCOUNTER — RECORDS - HEALTHEAST (OUTPATIENT)
Dept: LAB | Facility: CLINIC | Age: 85
End: 2020-07-23

## 2020-07-24 LAB
ANION GAP SERPL CALCULATED.3IONS-SCNC: 8 MMOL/L (ref 5–18)
BNP SERPL-MCNC: 861 PG/ML (ref 0–167)
BUN SERPL-MCNC: 19 MG/DL (ref 8–28)
CALCIUM SERPL-MCNC: 8.5 MG/DL (ref 8.5–10.5)
CHLORIDE BLD-SCNC: 106 MMOL/L (ref 98–107)
CO2 SERPL-SCNC: 28 MMOL/L (ref 22–31)
CREAT SERPL-MCNC: 0.74 MG/DL (ref 0.6–1.1)
GFR SERPL CREATININE-BSD FRML MDRD: >60 ML/MIN/1.73M2
GLUCOSE BLD-MCNC: 78 MG/DL (ref 70–125)
POTASSIUM BLD-SCNC: 3.8 MMOL/L (ref 3.5–5)
SODIUM SERPL-SCNC: 142 MMOL/L (ref 136–145)

## 2021-05-30 ENCOUNTER — RECORDS - HEALTHEAST (OUTPATIENT)
Dept: ADMINISTRATIVE | Facility: CLINIC | Age: 86
End: 2021-05-30

## 2021-06-01 ENCOUNTER — RECORDS - HEALTHEAST (OUTPATIENT)
Dept: ADMINISTRATIVE | Facility: CLINIC | Age: 86
End: 2021-06-01

## 2021-06-09 ENCOUNTER — RECORDS - HEALTHEAST (OUTPATIENT)
Dept: ADMINISTRATIVE | Facility: CLINIC | Age: 86
End: 2021-06-09

## 2022-01-01 ENCOUNTER — LAB REQUISITION (OUTPATIENT)
Dept: LAB | Facility: CLINIC | Age: 87
End: 2022-01-01
Payer: COMMERCIAL

## 2022-01-01 DIAGNOSIS — I10 ESSENTIAL (PRIMARY) HYPERTENSION: ICD-10-CM

## 2022-01-01 LAB
ANION GAP SERPL CALCULATED.3IONS-SCNC: 9 MMOL/L (ref 5–18)
BUN SERPL-MCNC: 26 MG/DL (ref 8–28)
CALCIUM SERPL-MCNC: 8.7 MG/DL (ref 8.5–10.5)
CHLORIDE BLD-SCNC: 105 MMOL/L (ref 98–107)
CO2 SERPL-SCNC: 27 MMOL/L (ref 22–31)
CREAT SERPL-MCNC: 0.76 MG/DL (ref 0.6–1.1)
ERYTHROCYTE [DISTWIDTH] IN BLOOD BY AUTOMATED COUNT: 14.1 % (ref 10–15)
GFR SERPL CREATININE-BSD FRML MDRD: 74 ML/MIN/1.73M2
GLUCOSE BLD-MCNC: 77 MG/DL (ref 70–125)
HCT VFR BLD AUTO: 33.4 % (ref 35–47)
HGB BLD-MCNC: 10.6 G/DL (ref 11.7–15.7)
MCH RBC QN AUTO: 29.6 PG (ref 26.5–33)
MCHC RBC AUTO-ENTMCNC: 31.7 G/DL (ref 31.5–36.5)
MCV RBC AUTO: 93 FL (ref 78–100)
PLATELET # BLD AUTO: 193 10E3/UL (ref 150–450)
POTASSIUM BLD-SCNC: 4.2 MMOL/L (ref 3.5–5)
RBC # BLD AUTO: 3.58 10E6/UL (ref 3.8–5.2)
SODIUM SERPL-SCNC: 141 MMOL/L (ref 136–145)
WBC # BLD AUTO: 5 10E3/UL (ref 4–11)

## 2022-01-01 PROCEDURE — 36415 COLL VENOUS BLD VENIPUNCTURE: CPT | Mod: ORL | Performed by: NURSE PRACTITIONER

## 2022-01-01 PROCEDURE — 80048 BASIC METABOLIC PNL TOTAL CA: CPT | Mod: ORL | Performed by: NURSE PRACTITIONER

## 2022-01-01 PROCEDURE — P9604 ONE-WAY ALLOW PRORATED TRIP: HCPCS | Mod: ORL | Performed by: NURSE PRACTITIONER

## 2022-01-01 PROCEDURE — 85027 COMPLETE CBC AUTOMATED: CPT | Mod: ORL | Performed by: NURSE PRACTITIONER

## 2024-07-26 NOTE — PROGRESS NOTES
"  Adult Mental Health Outpatient Group Therapy Progress Note     Client Initial Individualized Goals for Treatment: \" to get comfortable in my skin, start to have more energy to care for self\".    See Initial Treatment suggestions for the client during the time between Diagnostic Assessment and completion of the Master Individualized Treatment Plan:    Treatment Goals:    1. Client will use coping plan for safety, as needed.    2. Will report on symptoms and identify skills to use to manage depression, anxiety, low energy, poor appetite.    3. Will develop an aftercare / transition plan by increased engagement with family and community     Area of Treatment Focus:  Symptom Management, Develop / Improve Independent Living Skills and Develop Socialization / Interpersonal Relationship Skills    Therapeutic Interventions/Treatment Strategies:  Support, Feedback, Safety Assessments, Structured Activity and Problem Solving    Response to Treatment Strategies:  Accepted Feedback, Gave Feedback, Listened, Focused on Goals, Attentive and Accepted Support    Name of Group:  Wellness     Description and Outcome:  Client attended and participated in a structured life skills group session where intervention focused on developing assertive communication skills. Client had a calm, even affect. Quiet, but attentive throughout session. Good insight into assertiveness strategies. Fair task focus. Client demonstrated understanding of session content by problem solving during session to develop an assertive response to an example scenario provided in session. Client addressed ITP goal number 2 in this session.    Is this a Weekly Review of the Progress on the Treatment Plan?  No          " Pt oliver PO.

## 2025-06-30 NOTE — PROGRESS NOTES
Pt is rating anxiety and depression at 6/10 this AM. Pt denies SI/SIB. Pt reports that she feels ready to be discharged. Pt reports that sleep and appetite have improved.    recovery